# Patient Record
Sex: MALE | Race: WHITE | NOT HISPANIC OR LATINO | Employment: FULL TIME | URBAN - METROPOLITAN AREA
[De-identification: names, ages, dates, MRNs, and addresses within clinical notes are randomized per-mention and may not be internally consistent; named-entity substitution may affect disease eponyms.]

---

## 2024-04-04 ASSESSMENT — PATIENT HEALTH QUESTIONNAIRE - PHQ9
10. IF YOU CHECKED OFF ANY PROBLEMS, HOW DIFFICULT HAVE THESE PROBLEMS MADE IT FOR YOU TO DO YOUR WORK, TAKE CARE OF THINGS AT HOME, OR GET ALONG WITH OTHER PEOPLE: SOMEWHAT DIFFICULT
SUM OF ALL RESPONSES TO PHQ QUESTIONS 1-9: 18
SUM OF ALL RESPONSES TO PHQ QUESTIONS 1-9: 18

## 2024-04-05 ENCOUNTER — OFFICE VISIT (OUTPATIENT)
Dept: FAMILY MEDICINE | Facility: CLINIC | Age: 29
End: 2024-04-05
Payer: COMMERCIAL

## 2024-04-05 VITALS
OXYGEN SATURATION: 100 % | BODY MASS INDEX: 25.2 KG/M2 | RESPIRATION RATE: 18 BRPM | HEART RATE: 88 BPM | TEMPERATURE: 98.7 F | HEIGHT: 71 IN | DIASTOLIC BLOOD PRESSURE: 83 MMHG | SYSTOLIC BLOOD PRESSURE: 119 MMHG | WEIGHT: 180 LBS

## 2024-04-05 DIAGNOSIS — Z11.4 SCREENING FOR HIV (HUMAN IMMUNODEFICIENCY VIRUS): ICD-10-CM

## 2024-04-05 DIAGNOSIS — Z13.220 LIPID SCREENING: ICD-10-CM

## 2024-04-05 DIAGNOSIS — Z11.3 SCREEN FOR STD (SEXUALLY TRANSMITTED DISEASE): ICD-10-CM

## 2024-04-05 DIAGNOSIS — R21 RASH AND NONSPECIFIC SKIN ERUPTION: ICD-10-CM

## 2024-04-05 DIAGNOSIS — Z00.00 ROUTINE HISTORY AND PHYSICAL EXAMINATION OF ADULT: Primary | ICD-10-CM

## 2024-04-05 DIAGNOSIS — R06.83 SNORING: ICD-10-CM

## 2024-04-05 DIAGNOSIS — G47.00 INSOMNIA, UNSPECIFIED TYPE: ICD-10-CM

## 2024-04-05 DIAGNOSIS — F33.1 MODERATE EPISODE OF RECURRENT MAJOR DEPRESSIVE DISORDER (H): ICD-10-CM

## 2024-04-05 DIAGNOSIS — Z11.59 NEED FOR HEPATITIS C SCREENING TEST: ICD-10-CM

## 2024-04-05 DIAGNOSIS — F41.1 GAD (GENERALIZED ANXIETY DISORDER): ICD-10-CM

## 2024-04-05 DIAGNOSIS — J34.2 NASAL SEPTAL DEVIATION: ICD-10-CM

## 2024-04-05 DIAGNOSIS — R06.5 MOUTH BREATHING: ICD-10-CM

## 2024-04-05 LAB
ERYTHROCYTE [DISTWIDTH] IN BLOOD BY AUTOMATED COUNT: 11.8 % (ref 10–15)
HCT VFR BLD AUTO: 47.4 % (ref 40–53)
HGB BLD-MCNC: 16.1 G/DL (ref 13.3–17.7)
MCH RBC QN AUTO: 28.2 PG (ref 26.5–33)
MCHC RBC AUTO-ENTMCNC: 34 G/DL (ref 31.5–36.5)
MCV RBC AUTO: 83 FL (ref 78–100)
PLATELET # BLD AUTO: 230 10E3/UL (ref 150–450)
RBC # BLD AUTO: 5.7 10E6/UL (ref 4.4–5.9)
WBC # BLD AUTO: 3.8 10E3/UL (ref 4–11)

## 2024-04-05 PROCEDURE — 87389 HIV-1 AG W/HIV-1&-2 AB AG IA: CPT | Performed by: INTERNAL MEDICINE

## 2024-04-05 PROCEDURE — 90686 IIV4 VACC NO PRSV 0.5 ML IM: CPT | Performed by: INTERNAL MEDICINE

## 2024-04-05 PROCEDURE — 87491 CHLMYD TRACH DNA AMP PROBE: CPT | Performed by: INTERNAL MEDICINE

## 2024-04-05 PROCEDURE — 99213 OFFICE O/P EST LOW 20 MIN: CPT | Mod: 25 | Performed by: INTERNAL MEDICINE

## 2024-04-05 PROCEDURE — 90471 IMMUNIZATION ADMIN: CPT | Performed by: INTERNAL MEDICINE

## 2024-04-05 PROCEDURE — 85027 COMPLETE CBC AUTOMATED: CPT | Performed by: INTERNAL MEDICINE

## 2024-04-05 PROCEDURE — 90480 ADMN SARSCOV2 VAC 1/ONLY CMP: CPT | Performed by: INTERNAL MEDICINE

## 2024-04-05 PROCEDURE — 91320 SARSCV2 VAC 30MCG TRS-SUC IM: CPT | Performed by: INTERNAL MEDICINE

## 2024-04-05 PROCEDURE — 86695 HERPES SIMPLEX TYPE 1 TEST: CPT | Performed by: INTERNAL MEDICINE

## 2024-04-05 PROCEDURE — 90472 IMMUNIZATION ADMIN EACH ADD: CPT | Performed by: INTERNAL MEDICINE

## 2024-04-05 PROCEDURE — 99000 SPECIMEN HANDLING OFFICE-LAB: CPT | Performed by: INTERNAL MEDICINE

## 2024-04-05 PROCEDURE — 99385 PREV VISIT NEW AGE 18-39: CPT | Mod: 25 | Performed by: INTERNAL MEDICINE

## 2024-04-05 PROCEDURE — 86803 HEPATITIS C AB TEST: CPT | Performed by: INTERNAL MEDICINE

## 2024-04-05 PROCEDURE — 80053 COMPREHEN METABOLIC PANEL: CPT | Performed by: INTERNAL MEDICINE

## 2024-04-05 PROCEDURE — 86696 HERPES SIMPLEX TYPE 2 TEST: CPT | Performed by: INTERNAL MEDICINE

## 2024-04-05 PROCEDURE — 86140 C-REACTIVE PROTEIN: CPT | Performed by: INTERNAL MEDICINE

## 2024-04-05 PROCEDURE — 36415 COLL VENOUS BLD VENIPUNCTURE: CPT | Performed by: INTERNAL MEDICINE

## 2024-04-05 PROCEDURE — 90715 TDAP VACCINE 7 YRS/> IM: CPT | Performed by: INTERNAL MEDICINE

## 2024-04-05 PROCEDURE — 86592 SYPHILIS TEST NON-TREP QUAL: CPT | Performed by: INTERNAL MEDICINE

## 2024-04-05 PROCEDURE — 86780 TREPONEMA PALLIDUM: CPT | Performed by: INTERNAL MEDICINE

## 2024-04-05 PROCEDURE — 87591 N.GONORRHOEAE DNA AMP PROB: CPT | Performed by: INTERNAL MEDICINE

## 2024-04-05 PROCEDURE — 84443 ASSAY THYROID STIM HORMONE: CPT | Performed by: INTERNAL MEDICINE

## 2024-04-05 ASSESSMENT — PAIN SCALES - GENERAL: PAINLEVEL: NO PAIN (0)

## 2024-04-05 NOTE — RESULT ENCOUNTER NOTE
Kahlil King-reviewed some of your labs,  CBC shows slightly low white blood cell count of 3800; normal greater than 4000, unsure etiology at this point,  I do not have prior blood counts to compare.  Hemoglobin hematocrit is normal reassuring; sometimes there is no anemia, and platelet count is normal.  I recommend we repeat your white blood cell count in the next 4 weeks by lab.  Please call the lab to schedule.  I will place an order for such.  Low white count sometimes occur during a viral illness.  You did not have any signs or symptoms of viral illness currently,  Rest of labs are pending ,  Regards,  Dr Boswell

## 2024-04-05 NOTE — PROGRESS NOTES
"  Assessment & Plan   Problem List Items Addressed This Visit    None  Visit Diagnoses       Routine history and physical examination of adult    -  Primary    Screening for HIV (human immunodeficiency virus)        Relevant Orders    HIV Antigen Antibody Combo (Completed)    Need for hepatitis C screening test        Relevant Orders    Hepatitis C Screen Reflex to HCV RNA Quant and Genotype (Completed)    Lipid screening        Moderate episode of recurrent major depressive disorder (H)        Occasional suicidal ideation \"no need to be here\", with no plans in place for self-harm or others.    VANNESSA (generalized anxiety disorder)        Relevant Orders    CBC with platelets (Completed)    Comprehensive metabolic panel (BMP + Alb, Alk Phos, ALT, AST, Total. Bili, TP) (Completed)    TSH with free T4 reflex (Completed)    Adult Mental Health  Referral    Adult Mental Health  Referral    Screen for STD (sexually transmitted disease)        Relevant Orders    NEISSERIA GONORRHOEA PCR (Completed)    CHLAMYDIA TRACHOMATIS PCR (Completed)    Treponema Abs w Reflex to RPR and Titer (Completed)    Herpes Simplex Virus 1 and 2 IgG    Adult Mental Health  Referral    Adult Mental Health  Referral    Rapid Plasma Reagin w Rflx to TITER    Insomnia, unspecified type        Relevant Orders    Adult Mental Health  Referral    Adult Mental Health  Referral    Nasal septal deviation        Relevant Orders    Adult ENT  Referral    Snoring        Relevant Orders    Adult ENT  Referral    Mouth breathing        Relevant Orders    Adult ENT  Referral    Rash and nonspecific skin eruption        Suspected eczema, applied anti-itch cream plus moisturizer.  If not helpful will add triamcinolone.  Refer to dermatology    Relevant Orders    Adult Dermatology  Referral           Discuss chronic health conditions, history of anxiety and depression agrees " "to see mental health specialist in addition to counseling and psychiatry.  Has some nasal septal deviation suspected and nasal obstruction, Also has associated snoring at times.  No known apneas.referral to ENT.  Has insomnia also related to probable underlying anxiety.  Continue with exercise activity and healthy diet etc.  Patient requesting STD screening.  Has history of syphilis in the past treated :  Denies any urethral discharge.  He is in a monogamous relation with a male partner, he is not on any antiviral prophylactic therapy; partner is.  Has upper GI stomach issues feeling nauseous at times and bloated, consider H. pylori testing in future.      Addendum labs show slightly low white blood cell count advise repeating CBC next 4 weeks.    BMI  Estimated body mass index is 25.1 kg/m  as calculated from the following:    Height as of this encounter: 1.803 m (5' 11\").    Weight as of this encounter: 81.6 kg (180 lb).   Weight management plan: Discussed healthy diet and exercise guidelines    Depression Screening Follow Up        4/4/2024    11:49 AM   PHQ   PHQ-9 Total Score 18   Q9: Thoughts of better off dead/self-harm past 2 weeks Several days   F/U: Thoughts of suicide or self-harm No   F/U: Safety concerns No         4/4/2024    11:49 AM   Last PHQ-9   1.  Little interest or pleasure in doing things 2   2.  Feeling down, depressed, or hopeless 2   3.  Trouble falling or staying asleep, or sleeping too much 3   4.  Feeling tired or having little energy 3   5.  Poor appetite or overeating 2   6.  Feeling bad about yourself 2   7.  Trouble concentrating 2   8.  Moving slowly or restless 1   Q9: Thoughts of better off dead/self-harm past 2 weeks 1   PHQ-9 Total Score 18   In the past two weeks have you had thoughts of suicide or self harm? No   Do you have concerns about your personal safety or the safety of others? No                No data to display                      Follow Up Actions Taken  Crisis " resource information provided in the After Visit Summary  Mental Health Referral placed    Discussed the following ways the patient can remain in a safe environment:  be around others  Work on weight loss  Regular exercise  See Patient Instructions      William King is a 28 year old, presenting for the following health issues:  Establish Care and LAB REQUEST (STI/std UA Order)  Patient presenting to establish care.  Requesting some labs and physical.  He has some plaque raised like lesions on his elbows slightly scaly itchy at times.  He has also history of depression anxiety, and strong family history, has occasional suicidal ideation but no plans in place, he was on medication in the past made him super tired, he does 2 jobs now.  Has been off meds for 3 years now, medications made him worse super tired, his mother has bipolar disorder she is on meds for anxiety and depression history of?  ADHD, also has some problems with his breathing, he is a mouth breather, had MRI that showed deviated septum, has occasional snoring.  Previous surgical history of the graft on his gum.  Also sister with lupus and brother history of leukemia..  Has some stomach issues feels nauseated at times and bloated.      4/5/2024    11:13 AM   Additional Questions   Roomed by Charlene   Accompanied by Not applicable, by themselves     History of Present Illness       Reason for visit:  General health check    He eats 0-1 servings of fruits and vegetables daily.He consumes 1 sweetened beverage(s) daily.He exercises with enough effort to increase his heart rate 9 or less minutes per day.  He exercises with enough effort to increase his heart rate 3 or less days per week.   He is taking medications regularly.             Review of Systems  Constitutional, neuro, ENT, endocrine, pulmonary, cardiac, gastrointestinal, genitourinary, musculoskeletal, integument and psychiatric systems are negative, except as otherwise noted.      Objective   "  /83   Pulse 88   Temp 98.7  F (37.1  C) (Temporal)   Resp 18   Ht 1.803 m (5' 11\")   Wt 81.6 kg (180 lb)   SpO2 100%   BMI 25.10 kg/m    Body mass index is 25.1 kg/m .  Physical Exam   GENERAL: alert and no distress  EYES: Eyes grossly normal to inspection, PERRL and conjunctivae and sclerae normal  HENT: ear canals and TM's normal, nose and mouth without ulcers or lesions  NECK: no adenopathy, no asymmetry, masses, or scars  RESP: lungs clear to auscultation - no rales, rhonchi or wheezes  CV: regular rate and rhythm, normal S1 S2, no S3 or S4, no murmur, click or rub, no peripheral edema  ABDOMEN: soft, nontender, no hepatosplenomegaly, no masses and bowel sounds normal  MS: no gross musculoskeletal defects noted, no edema  SKIN: no suspicious lesions or rashes  NEURO: Normal strength and tone, mentation intact and speech normal  PSYCH: mentation appears normal, affect normal/bright    No results found for any previous visit.           Signed Electronically by: Tamela Boswell MD    "

## 2024-04-06 DIAGNOSIS — D72.819 LEUKOPENIA, UNSPECIFIED TYPE: Primary | ICD-10-CM

## 2024-04-06 LAB
ALBUMIN SERPL BCG-MCNC: 5.1 G/DL (ref 3.5–5.2)
ALP SERPL-CCNC: 65 U/L (ref 40–150)
ALT SERPL W P-5'-P-CCNC: 23 U/L (ref 0–70)
ANION GAP SERPL CALCULATED.3IONS-SCNC: 13 MMOL/L (ref 7–15)
AST SERPL W P-5'-P-CCNC: 22 U/L (ref 0–45)
BILIRUB SERPL-MCNC: 0.4 MG/DL
BUN SERPL-MCNC: 14 MG/DL (ref 6–20)
C TRACH DNA SPEC QL NAA+PROBE: NEGATIVE
CALCIUM SERPL-MCNC: 9.9 MG/DL (ref 8.6–10)
CHLORIDE SERPL-SCNC: 103 MMOL/L (ref 98–107)
CREAT SERPL-MCNC: 0.78 MG/DL (ref 0.67–1.17)
CRP SERPL-MCNC: <3 MG/L
DEPRECATED HCO3 PLAS-SCNC: 26 MMOL/L (ref 22–29)
EGFRCR SERPLBLD CKD-EPI 2021: >90 ML/MIN/1.73M2
GLUCOSE SERPL-MCNC: 85 MG/DL (ref 70–99)
HCV AB SERPL QL IA: NONREACTIVE
HIV 1+2 AB+HIV1 P24 AG SERPL QL IA: NONREACTIVE
N GONORRHOEA DNA SPEC QL NAA+PROBE: NEGATIVE
POTASSIUM SERPL-SCNC: 4.4 MMOL/L (ref 3.4–5.3)
PROT SERPL-MCNC: 7.6 G/DL (ref 6.4–8.3)
SODIUM SERPL-SCNC: 142 MMOL/L (ref 135–145)
T PALLIDUM AB SER QL: REACTIVE
TSH SERPL DL<=0.005 MIU/L-ACNC: 1.2 UIU/ML (ref 0.3–4.2)

## 2024-04-06 NOTE — RESULT ENCOUNTER NOTE
Kahlil King, I reviewed some of your labs,  STD testing including hepatitis C and HIV are negative/nonreactive,  Treponema Antibody Total is reactive waiting for RPR test.  Chemistry shows normal electrolytes, normal kidney function test, normal liver enzymes,  Thyroid test called TSH is normal, other STD testing including Neisseria gonorrhea and chlamydia are negative/nonreactive,  CBC shows low white blood cell count, please repeat CBC in the next 4 weeks.  Any further questions please let me know  Dr Boswell

## 2024-04-07 NOTE — RESULT ENCOUNTER NOTE
Kahlil King' I checked inflammatory marker called CRP and is negative, suggests no systemic inflammation, added such to your labs due to the slightly low white blood cell count.  Again as advised please repeat your CBC in the next 4 weeks to make sure the white cell count has improved to normal.  The white blood cell count was slightly low but is not critical and should not affect your immunity to fight infection in any event..  Regards,  Dr Boswell

## 2024-04-08 LAB
HSV1 IGG SERPL QL IA: 1.26 INDEX
HSV1 IGG SERPL QL IA: ABNORMAL
HSV2 IGG SERPL QL IA: 0.61 INDEX
HSV2 IGG SERPL QL IA: ABNORMAL
RPR SER QL: NONREACTIVE

## 2024-04-08 NOTE — RESULT ENCOUNTER NOTE
Please notify patient some of labs are back for STD screen:    HSV-1 titer type I antibodies positive, type II is negative.  Herpes simplex type II usually close genital herpes.  Herpes simplex virus type I infection is very common in normal healthy people.

## 2024-04-09 ENCOUNTER — TELEPHONE (OUTPATIENT)
Dept: FAMILY MEDICINE | Facility: CLINIC | Age: 29
End: 2024-04-09
Payer: COMMERCIAL

## 2024-04-09 LAB — T PALLIDUM AB SER QL AGGL: REACTIVE

## 2024-04-09 NOTE — RESULT ENCOUNTER NOTE
Please reassure rapid plasma reagin which is confirmatory syphilis test is nonreactive; negative; part of STD testing screening.  Dr Boswell

## 2024-04-15 ENCOUNTER — OFFICE VISIT (OUTPATIENT)
Dept: DERMATOLOGY | Facility: CLINIC | Age: 29
End: 2024-04-15
Payer: COMMERCIAL

## 2024-04-15 DIAGNOSIS — L30.8 PSORIASIFORM DERMATITIS: Primary | ICD-10-CM

## 2024-04-15 DIAGNOSIS — R21 RASH AND NONSPECIFIC SKIN ERUPTION: ICD-10-CM

## 2024-04-15 DIAGNOSIS — L64.9 ANDROGENETIC ALOPECIA: ICD-10-CM

## 2024-04-15 PROCEDURE — 99204 OFFICE O/P NEW MOD 45 MIN: CPT | Performed by: PHYSICIAN ASSISTANT

## 2024-04-15 RX ORDER — TRIAMCINOLONE ACETONIDE 1 MG/G
OINTMENT TOPICAL
Qty: 30 G | Refills: 4 | Status: SHIPPED | OUTPATIENT
Start: 2024-04-15

## 2024-04-15 RX ORDER — FINASTERIDE 1 MG/1
1 TABLET, FILM COATED ORAL DAILY
Qty: 90 TABLET | Refills: 3 | Status: SHIPPED | OUTPATIENT
Start: 2024-04-15

## 2024-04-15 RX ORDER — DEXTROAMPHETAMINE SACCHARATE, AMPHETAMINE ASPARTATE MONOHYDRATE, DEXTROAMPHETAMINE SULFATE AND AMPHETAMINE SULFATE 2.5; 2.5; 2.5; 2.5 MG/1; MG/1; MG/1; MG/1
10 CAPSULE, EXTENDED RELEASE ORAL
COMMUNITY
Start: 2023-03-29

## 2024-04-15 RX ORDER — FINASTERIDE 1 MG/1
1 TABLET, FILM COATED ORAL DAILY
COMMUNITY
End: 2024-04-15

## 2024-04-15 RX ORDER — BUPROPION HYDROCHLORIDE 150 MG/1
150 TABLET ORAL
COMMUNITY
Start: 2023-03-08

## 2024-04-15 ASSESSMENT — PAIN SCALES - GENERAL: PAINLEVEL: NO PAIN (0)

## 2024-04-15 NOTE — NURSING NOTE
Dermatology Rooming Note    Fernando Del Castillo's goals for this visit include:   Chief Complaint   Patient presents with    Derm Problem     Fernando is here today for a rash on the elbows that cracks      Alma Delia DAVILA, CMA

## 2024-04-15 NOTE — LETTER
4/15/2024       RE: Fernando Del Castillo  [address] [place] [address]     Dear Colleague,    Thank you for referring your patient, Fernando Del Castillo, to the [facility] [place] at [facility]. Please see a copy of my visit note below.    [facility] Dermatology Note  Encounter Date: Apr 15, 2024  Office Visit     Reviewed patients past medical history and pertinent chart review prior to patients visit today.     Dermatology Problem List:  # Psoriasiform dermatitis, elbows  - triamcinolone 0.1% ointment  # Androgenetic alopecia  - finasteride 1 mg daily    Social history: Works at [employer]  ____________________________________________    Assessment & Plan:     # Psoriasiform dermatitis, elbows  - The patient is not currently flaring today, but clinical history and findings are most consistent with psoriasiform dermatitis.   - Start triamcinolone 0.1% ointment twice daily for 2 weeks. Restart if rash flares again in the future. We reviewed potential side effects, including skin atrophy.    - We discussed the importance of daily emollients. Options include Vanicream, CeraVe Cream, Cetaphil Cream, or Vaseline. Avoid hot water when bathing. Use non-scented soaps and detergents. Pat skin dry instead of vigorous rubbing.     # Androgenetic alopecia   - Finasteride 1 mg daily.   - The patient reports tolerating the medication well. We discussed potential side effects including mood changes, sexual dysfunction, and gynecomastia.  - We discussed that finasteride may decrease PSA levels, causing for interpretation to be more difficult.     Follow up as needed.     All risks, benefits and alternatives were discussed with patient.  Patient is in agreement and understands the assessment and plan.  All questions were answered.  Korina Yancey PA-C  [facility] Dermatology  _______________________________________    CC: Derm  Problem    HPI:  Mr. Fernando Del Castillo is a(n) 28 year old male who presents today as a new patient for a rash on the elbows. This has been intermittently flaring over the years and is quite itchy. He has been using OTC eczema creams with some improvement. The patient reports a recent history of seasonal allergies, no history of asthma. He denies a known family history of eczema or psoriasis.  Additionally, the patient request refills of his finasteride 1 mg daily.  The patient reports tolerating this medication well with no side effects of mood changes, sexual dysfunction, or gynecomastia. He reports his hairline has remained stable. Patient is otherwise feeling well, without additional skin concerns.      Physical Exam:  SKIN: Focused examination of scalp and elbow was performed.  - Lichenification and scale involving the bilateral elbows (R > L).     - No other lesions of concern on areas examined.     Medications:  Current Outpatient Medications   Medication Sig Dispense Refill    amphetamine-dextroamphetamine (ADDERALL XR) 10 MG 24 hr capsule Take 10 mg by mouth      buPROPion (WELLBUTRIN XL) 150 MG 24 hr tablet Take 150 mg by mouth      finasteride (PROPECIA) 1 MG tablet Take 1 tablet by mouth daily       No current facility-administered medications for this visit.      Past Medical History:   There is no problem list on file for this patient.    No past medical history on file.    CC Tamela Boswell MD  [address] [place] [id number] on close of this encounter.

## 2024-04-15 NOTE — PROGRESS NOTES
[facility] Dermatology Note  Encounter Date: Apr 15, 2024  Office Visit     Reviewed patients past medical history and pertinent chart review prior to patients visit today.     Dermatology Problem List:  # Psoriasiform dermatitis, elbows  - triamcinolone 0.1% ointment  # Androgenetic alopecia  - finasteride 1 mg daily    Social history: Works at [employer]  ____________________________________________    Assessment & Plan:     # Psoriasiform dermatitis, elbows  - The patient is not currently flaring today, but clinical history and findings are most consistent with psoriasiform dermatitis.   - Start triamcinolone 0.1% ointment twice daily for 2 weeks. Restart if rash flares again in the future. We reviewed potential side effects, including skin atrophy.    - We discussed the importance of daily emollients. Options include Vanicream, CeraVe Cream, Cetaphil Cream, or Vaseline. Avoid hot water when bathing. Use non-scented soaps and detergents. Pat skin dry instead of vigorous rubbing.     # Androgenetic alopecia   - Finasteride 1 mg daily.   - The patient reports tolerating the medication well. We discussed potential side effects including mood changes, sexual dysfunction, and gynecomastia.  - We discussed that finasteride may decrease PSA levels, causing for interpretation to be more difficult.     Follow up as needed.     All risks, benefits and alternatives were discussed with patient.  Patient is in agreement and understands the assessment and plan.  All questions were answered.  Korina Yancey PA-C  [facility] Dermatology  _______________________________________    CC: Derm Problem    HPI:  Mr. Fernando Del Castillo is a(n) 28 year old male who presents today as a new patient for a rash on the elbows. This has been intermittently flaring over the years and is quite itchy. He has been using OTC eczema creams with some improvement. The patient reports a recent history of seasonal allergies, no  history of asthma. He denies a known family history of eczema or psoriasis.  Additionally, the patient request refills of his finasteride 1 mg daily.  The patient reports tolerating this medication well with no side effects of mood changes, sexual dysfunction, or gynecomastia. He reports his hairline has remained stable. Patient is otherwise feeling well, without additional skin concerns.      Physical Exam:  SKIN: Focused examination of scalp and elbow was performed.  - Lichenification and scale involving the bilateral elbows (R > L).     - No other lesions of concern on areas examined.     Medications:  Current Outpatient Medications   Medication Sig Dispense Refill    amphetamine-dextroamphetamine (ADDERALL XR) 10 MG 24 hr capsule Take 10 mg by mouth      buPROPion (WELLBUTRIN XL) 150 MG 24 hr tablet Take 150 mg by mouth      finasteride (PROPECIA) 1 MG tablet Take 1 tablet by mouth daily       No current facility-administered medications for this visit.      Past Medical History:   There is no problem list on file for this patient.    No past medical history on file.    CC Tamela Boswell MD  [address] [id number] on close of this encounter.

## 2024-04-25 NOTE — TELEPHONE ENCOUNTER
"FUTURE VISIT INFORMATION      FUTURE VISIT INFORMATION:  Date: 7/31/24  Time: 9AM  Location: [facility] [place]  REFERRAL INFORMATION:  Referring provider:  Tamela Boswell MD  Referring providers clinic:  [facility]   Reason for visit/diagnosis  NASAL DEVIATION, SNORING, MOUTH BREATHING  OUTSIDE OF NOSE IS CROOKED AND WOULD LIKE IT FIXED  REF BY Tamela Boswell MD  RECS IN [facility]  CONF LOC  SCHED W/PT     RECORDS REQUESTED FROM:       Clinic name Comments Records Status Imaging Status   [facility] 4/5/24- OV Tamela Boswell MD Epic             \"Please notify/message CSS if patient completed outside imaging prior to scheduled appointment and/or any outside records that might have been missed at pre visit -Thank you\"  "

## 2024-05-08 ENCOUNTER — LAB (OUTPATIENT)
Dept: LAB | Facility: CLINIC | Age: 29
End: 2024-05-08
Payer: COMMERCIAL

## 2024-05-08 DIAGNOSIS — D72.819 LEUKOPENIA, UNSPECIFIED TYPE: ICD-10-CM

## 2024-05-08 LAB
BASOPHILS # BLD AUTO: 0 10E3/UL (ref 0–0.2)
BASOPHILS NFR BLD AUTO: 0 %
EOSINOPHIL # BLD AUTO: 0.1 10E3/UL (ref 0–0.7)
EOSINOPHIL NFR BLD AUTO: 1 %
ERYTHROCYTE [DISTWIDTH] IN BLOOD BY AUTOMATED COUNT: 11.9 % (ref 10–15)
HCT VFR BLD AUTO: 46.3 % (ref 40–53)
HGB BLD-MCNC: 16 G/DL (ref 13.3–17.7)
IMM GRANULOCYTES # BLD: 0 10E3/UL
IMM GRANULOCYTES NFR BLD: 0 %
LYMPHOCYTES # BLD AUTO: 1.6 10E3/UL (ref 0.8–5.3)
LYMPHOCYTES NFR BLD AUTO: 44 %
MCH RBC QN AUTO: 28.2 PG (ref 26.5–33)
MCHC RBC AUTO-ENTMCNC: 34.6 G/DL (ref 31.5–36.5)
MCV RBC AUTO: 82 FL (ref 78–100)
MONOCYTES # BLD AUTO: 0.3 10E3/UL (ref 0–1.3)
MONOCYTES NFR BLD AUTO: 8 %
NEUTROPHILS # BLD AUTO: 1.8 10E3/UL (ref 1.6–8.3)
NEUTROPHILS NFR BLD AUTO: 47 %
PLATELET # BLD AUTO: 231 10E3/UL (ref 150–450)
RBC # BLD AUTO: 5.68 10E6/UL (ref 4.4–5.9)
WBC # BLD AUTO: 3.7 10E3/UL (ref 4–11)

## 2024-05-08 PROCEDURE — 85025 COMPLETE CBC W/AUTO DIFF WBC: CPT

## 2024-05-08 PROCEDURE — 36415 COLL VENOUS BLD VENIPUNCTURE: CPT

## 2024-05-20 ENCOUNTER — MYC MEDICAL ADVICE (OUTPATIENT)
Dept: FAMILY MEDICINE | Facility: CLINIC | Age: 29
End: 2024-05-20
Payer: COMMERCIAL

## 2024-05-20 DIAGNOSIS — D72.819 LEUKOPENIA, UNSPECIFIED TYPE: Primary | ICD-10-CM

## 2024-05-20 DIAGNOSIS — G47.00 INSOMNIA, UNSPECIFIED TYPE: ICD-10-CM

## 2024-05-20 PROCEDURE — 99207 E-CONSULT TO HEMATOLOGY (ADULT OUTPT PROVIDER TO SPECIALIST WRITTEN QUESTION & RESPONSE): CPT | Performed by: INTERNAL MEDICINE

## 2024-05-22 RX ORDER — TRAZODONE HYDROCHLORIDE 50 MG/1
50 TABLET, FILM COATED ORAL AT BEDTIME
Qty: 90 TABLET | Refills: 1 | Status: CANCELLED | OUTPATIENT
Start: 2024-05-22

## 2024-05-22 NOTE — TELEPHONE ENCOUNTER
Dr. Boswell,    Please see pt's MyChart messages and advise.   Orders pended for your review.     Thank you,  Sindhu Webb RN

## 2024-05-23 ENCOUNTER — E-CONSULT (OUTPATIENT)
Dept: ONCOLOGY | Facility: CLINIC | Age: 29
End: 2024-05-23
Payer: COMMERCIAL

## 2024-05-23 PROCEDURE — 99451 NTRPROF PH1/NTRNET/EHR 5/>: CPT | Performed by: INTERNAL MEDICINE

## 2024-05-23 NOTE — PROGRESS NOTES
5/23/2024     E-Consult has been accepted.    Interprofessional consultation requested by:  Tamela Boswell MD      Clinical Question/Purpose: MY CLINICAL QUESTION IS: Low white blood cell count    Patient assessment and information reviewed:   Otherwise healthy young man with WBC ~3.7 x 2 checks in April and May. Normal ANC, ALC, and other WBC subsets. Similar findings in 3/2023 and 4/2021. MCV in 3/2023 was a little low at 82 but ferritin and fe sat were normal.     Recommendations:   Reassurance, especially as each of the WBC subsets is normal.      The recommendations provided in this E-Consult are based on a review of clinical data pertinent to the clinical question presented, without a review of the patient's complete medical record or, the benefit of a comprehensive in-person or virtual patient evaluation. This consultation should not replace the clinical judgement and evaluation of the provider ordering this E-Consult. Any new clinical issues, or changes in patient status since the filing of this E-Consult will need to be taken into account when assessing these recommendations. Please contact me if you have further questions.    My total time spent reviewing clinical information and formulating assessment was 5 minutes.    Eusebio Streeter MD

## 2024-05-29 ENCOUNTER — VIRTUAL VISIT (OUTPATIENT)
Dept: FAMILY MEDICINE | Facility: CLINIC | Age: 29
End: 2024-05-29
Payer: COMMERCIAL

## 2024-05-29 DIAGNOSIS — G47.09 OTHER INSOMNIA: Primary | ICD-10-CM

## 2024-05-29 DIAGNOSIS — F41.9 ANXIETY AND DEPRESSION: ICD-10-CM

## 2024-05-29 DIAGNOSIS — F43.23 ADJUSTMENT REACTION WITH ANXIETY AND DEPRESSION: ICD-10-CM

## 2024-05-29 DIAGNOSIS — F90.9 ATTENTION DEFICIT HYPERACTIVITY DISORDER (ADHD), UNSPECIFIED ADHD TYPE: ICD-10-CM

## 2024-05-29 DIAGNOSIS — F32.A ANXIETY AND DEPRESSION: ICD-10-CM

## 2024-05-29 DIAGNOSIS — D72.819 LEUKOPENIA, UNSPECIFIED TYPE: ICD-10-CM

## 2024-05-29 PROCEDURE — 99214 OFFICE O/P EST MOD 30 MIN: CPT | Mod: 95 | Performed by: INTERNAL MEDICINE

## 2024-05-29 RX ORDER — TRAZODONE HYDROCHLORIDE 50 MG/1
50 TABLET, FILM COATED ORAL AT BEDTIME
Qty: 90 TABLET | Refills: 0 | Status: SHIPPED | OUTPATIENT
Start: 2024-05-29 | End: 2024-07-30

## 2024-05-29 RX ORDER — TRAZODONE HYDROCHLORIDE 50 MG/1
TABLET, FILM COATED ORAL
COMMUNITY
Start: 2024-04-19 | End: 2024-05-29

## 2024-05-29 ASSESSMENT — PATIENT HEALTH QUESTIONNAIRE - PHQ9: SUM OF ALL RESPONSES TO PHQ QUESTIONS 1-9: 9

## 2024-05-29 NOTE — PROGRESS NOTES
Fernando is a 28 year old who is being evaluated via a billable video visit.    How would you like to obtain your AVS? MyChart  If the video visit is dropped, the invitation should be resent by: Text to cell phone: [phone]  Will anyone else be joining your video visit? No      Assessment & Plan   Problem List Items Addressed This Visit    None  Visit Diagnoses       Other insomnia    -  Primary    Relevant Medications    traZODone (DESYREL) 50 MG tablet    Other Relevant Orders    Adult Mental Health  Referral    Adult Mental Health  Referral    Adult Mental Health  Referral    Adjustment reaction with anxiety and depression        Anxiety and depression        Relevant Medications    traZODone (DESYREL) 50 MG tablet    Other Relevant Orders    Adult Mental Health  Referral    Adult Mental Health  Referral    Adult Mental Health  Referral    Attention deficit hyperactivity disorder (ADHD), unspecified ADHD type        Relevant Orders    Adult Mental Health  Referral    Adult Mental Health  Referral    Adult Mental Health  Referral    Leukopenia, unspecified type            Refill given on trazodone advised on side effects we will check a baseline EKG.  Discussed about QT prolongation and serotonin syndrome etc.  Establish with counseling and psychiatry at [place].  Meanwhile we will continue to refill medications as needed.  Exercise and lifestyle changes.  All questions answered    HEMATOLOGY NOTE.  Reviewed with patient hematology note via follow-up telephone visit.  Per hematology intermittent slightly low white count is not concerning at this point we will continue to monitor.  Will repeat CBC again in 6 months.                 Subjective   Fernando is a 28 year old, presenting for the following health issues:  Refill Request, Recheck Medication, and Results        5/29/2024     4:12 PM   Additional Questions   Roomed by Charlene    Accompanied by Not applicable, by themselves     HPI     Patient presenting for follow-up.  He had seen counseling and psychiatry outside [place], does not want to follow with counseling the provider he is seen.  Psychiatry advised him to do an ADHD panel and prescribe for him trazodone 50 mg that seems to help him with his sleep, he used to struggle with falling asleep and staying asleep he is a light sleeper he reports.  Trazodone has helped him to sleep better without interruption.  It is helping quite a bit with his depression symptoms.  He takes before bedtime and he sleeps throughout the night, he felt pretty good as far as mood he feels happy and upbeat for the duration was on trazodone he ran out a week ago.  He is not on Adderall never refilled the prescription.  He is seeing ENT as well.  Denies any suicidal ideations.    Review of Systems  Constitutional, HEENT, cardiovascular, pulmonary, gi and gu systems are negative, except as otherwise noted.      Objective           Vitals:  No vitals were obtained today due to virtual visit.    Physical Exam   GENERAL: alert and no distress  EYES: Eyes grossly normal to inspection.  No discharge or erythema, or obvious scleral/conjunctival abnormalities.  RESP: No audible wheeze, cough, or visible cyanosis.    SKIN: Visible skin clear. No significant rash, abnormal pigmentation or lesions.  NEURO: Cranial nerves grossly intact.  Mentation and speech appropriate for age.  PSYCH: Appropriate affect, tone, and pace of words    Lab on 05/08/2024   Component Date Value Ref Range Status    WBC Count 05/08/2024 3.7 (L)  4.0 - 11.0 10e3/uL Final    RBC Count 05/08/2024 5.68  4.40 - 5.90 10e6/uL Final    Hemoglobin 05/08/2024 16.0  13.3 - 17.7 g/dL Final    Hematocrit 05/08/2024 46.3  40.0 - 53.0 % Final    MCV 05/08/2024 82  78 - 100 fL Final    MCH 05/08/2024 28.2  26.5 - 33.0 pg Final    MCHC 05/08/2024 34.6  31.5 - 36.5 g/dL Final    RDW 05/08/2024 11.9  10.0 - 15.0 %  Final    Platelet Count 05/08/2024 231  150 - 450 10e3/uL Final    % Neutrophils 05/08/2024 47  % Final    % Lymphocytes 05/08/2024 44  % Final    % Monocytes 05/08/2024 8  % Final    % Eosinophils 05/08/2024 1  % Final    % Basophils 05/08/2024 0  % Final    % Immature Granulocytes 05/08/2024 0  % Final    Absolute Neutrophils 05/08/2024 1.8  1.6 - 8.3 10e3/uL Final    Absolute Lymphocytes 05/08/2024 1.6  0.8 - 5.3 10e3/uL Final    Absolute Monocytes 05/08/2024 0.3  0.0 - 1.3 10e3/uL Final    Absolute Eosinophils 05/08/2024 0.1  0.0 - 0.7 10e3/uL Final    Absolute Basophils 05/08/2024 0.0  0.0 - 0.2 10e3/uL Final    Absolute Immature Granulocytes 05/08/2024 0.0  <=0.4 10e3/uL Final         Video-Visit Details    Type of service:  Video Visit   Originating Location (pt. Location): Home    Distant Location (provider location):  On-site  Platform used for Video Visit: [place]  Signed Electronically by: Tamela Boswell MD

## 2024-07-30 ENCOUNTER — MYC MEDICAL ADVICE (OUTPATIENT)
Dept: FAMILY MEDICINE | Facility: CLINIC | Age: 29
End: 2024-07-30

## 2024-07-30 ENCOUNTER — MYC REFILL (OUTPATIENT)
Dept: FAMILY MEDICINE | Facility: CLINIC | Age: 29
End: 2024-07-30

## 2024-07-30 DIAGNOSIS — T88.7XXA MEDICATION SIDE EFFECTS: Primary | ICD-10-CM

## 2024-07-30 DIAGNOSIS — F32.A ANXIETY AND DEPRESSION: ICD-10-CM

## 2024-07-30 DIAGNOSIS — G47.09 OTHER INSOMNIA: ICD-10-CM

## 2024-07-30 DIAGNOSIS — F41.9 ANXIETY AND DEPRESSION: ICD-10-CM

## 2024-07-30 PROCEDURE — 99207 EKG 12-LEAD COMPLETE W/READ - CLINICS: CPT | Performed by: INTERNAL MEDICINE

## 2024-07-30 RX ORDER — TRAZODONE HYDROCHLORIDE 50 MG/1
50 TABLET, FILM COATED ORAL AT BEDTIME
Qty: 90 TABLET | Refills: 0 | Status: SHIPPED | OUTPATIENT
Start: 2024-07-30

## 2024-07-31 ENCOUNTER — PRE VISIT (OUTPATIENT)
Dept: OTOLARYNGOLOGY | Facility: CLINIC | Age: 29
End: 2024-07-31

## 2024-07-31 ENCOUNTER — ALLIED HEALTH/NURSE VISIT (OUTPATIENT)
Dept: FAMILY MEDICINE | Facility: CLINIC | Age: 29
End: 2024-07-31
Payer: COMMERCIAL

## 2024-07-31 ENCOUNTER — OFFICE VISIT (OUTPATIENT)
Dept: OTOLARYNGOLOGY | Facility: CLINIC | Age: 29
End: 2024-07-31
Attending: INTERNAL MEDICINE
Payer: COMMERCIAL

## 2024-07-31 VITALS
HEART RATE: 87 BPM | SYSTOLIC BLOOD PRESSURE: 150 MMHG | BODY MASS INDEX: 25.76 KG/M2 | WEIGHT: 184 LBS | DIASTOLIC BLOOD PRESSURE: 87 MMHG | HEIGHT: 71 IN

## 2024-07-31 DIAGNOSIS — J34.2 NASAL SEPTAL DEVIATION: ICD-10-CM

## 2024-07-31 DIAGNOSIS — R06.83 SNORING: ICD-10-CM

## 2024-07-31 DIAGNOSIS — T88.7XXA MEDICATION SIDE EFFECTS: Primary | ICD-10-CM

## 2024-07-31 DIAGNOSIS — J34.89 NASAL OBSTRUCTION: Primary | ICD-10-CM

## 2024-07-31 DIAGNOSIS — R06.5 MOUTH BREATHING: ICD-10-CM

## 2024-07-31 PROCEDURE — 99203 OFFICE O/P NEW LOW 30 MIN: CPT | Mod: 25 | Performed by: OTOLARYNGOLOGY

## 2024-07-31 PROCEDURE — 92511 NASOPHARYNGOSCOPY: CPT | Performed by: OTOLARYNGOLOGY

## 2024-07-31 PROCEDURE — 99207 PR NO CHARGE NURSE ONLY: CPT

## 2024-07-31 RX ORDER — TRAZODONE HYDROCHLORIDE 50 MG/1
50 TABLET, FILM COATED ORAL AT BEDTIME
Qty: 90 TABLET | Refills: 0 | OUTPATIENT
Start: 2024-07-31

## 2024-07-31 NOTE — PROGRESS NOTES
Facial Plastic and Reconstructive Surgery Consultation    Referring Provider:   Tamela Boswell MD  [address]  [place] [address]    HPI:   I had the pleasure of seeing Fernando Del Castillo today in clinic for consultation for nasal obstruction and deviated nasal septum.    Fernando Del Castillo is a 28 year old with no significant past medical history who presented for nasal obstruction and congestion. He was also told by his PCP 3 years ago that he had a deviated septum. The patient reports difficulty with breathing through his nose and constant congestion, which has resulted in some anxiety about this. The patient snores at night and mouth breathes. He is unsure of which side is better, but reports occasionally maxillary sinus pain. The patient denies rhinorrhea. The patient has no known history of nasal trauma and denies cocaine use. He has never been evaluated by an allergist, but has tried allergy medications to try to alleviate his symptoms, but had no relief. The patient has also tried nasal steroid sprays for 3 months but had no benefit. His main goal is to improve his breathing, but otherwise likes the appearance of his nose.     The patient has a history of smoking cigarettes from 7655-2500 and would smoke a pack every two weeks. He also had a vaping history, but quit in 2022.         Review Of Systems  ROS: 10 point ROS neg other than the symptoms noted above in the HPI.    There is no problem list on file for this patient.    No past surgical history on file.  Current Outpatient Medications   Medication Sig Dispense Refill    finasteride (PROPECIA) 1 MG tablet Take 1 tablet (1 mg) by mouth daily 90 tablet 3    traZODone (DESYREL) 50 MG tablet Take 1 tablet (50 mg) by mouth at bedtime 90 tablet 0    amphetamine-dextroamphetamine (ADDERALL XR) 10 MG 24 hr capsule Take 10 mg by mouth (Patient not taking: Reported on 5/29/2024)      buPROPion (WELLBUTRIN XL) 150 MG 24 hr tablet Take 150 mg by mouth       triamcinolone (KENALOG) 0.1 % external ointment Apply to rash twice daily for 2-4 weeks, then stop. Restart if rash flares in the future. Not for groin, underarms, or face. 30 g 4     Patient has no known allergies.  Social History     Socioeconomic History    Marital status: Single     Spouse name: Not on file    Number of children: Not on file    Years of education: Not on file    Highest education level: Not on file   Occupational History    Not on file   Tobacco Use    Smoking status: Former     Current packs/day: 0.00     Average packs/day: 0.5 packs/day for 7.0 years (3.5 ttl pk-yrs)     Types: Cigarettes     Start date: 9/1/2013     Quit date: 9/1/2020     Years since quitting: 3.9    Smokeless tobacco: Former     Quit date: 12/29/2022   Vaping Use    Vaping status: Never Used   Substance and Sexual Activity    Alcohol use: Yes     Comment: OCASSIONAL    Drug use: Never    Sexual activity: Yes     Partners: Male     Birth control/protection: None   Other Topics Concern    Parent/sibling w/ CABG, MI or angioplasty before 65F 55M? No   Social History Narrative    Not on file     Social Determinants of Health     Financial Resource Strain: Low Risk  (4/4/2024)    Financial Resource Strain     Within the past 12 months, have you or your family members you live with been unable to get utilities (heat, electricity) when it was really needed?: No   Food Insecurity: Unknown (4/4/2024)    Food Insecurity     Within the past 12 months, did you worry that your food would run out before you got money to buy more?: Patient declined     Within the past 12 months, did the food you bought just not last and you didn t have money to get more?: Patient declined   Transportation Needs: Low Risk  (4/4/2024)    Transportation Needs     Within the past 12 months, has lack of transportation kept you from medical appointments, getting your medicines, non-medical meetings or appointments, work, or from getting things that you need?:  No   Physical Activity: Inactive (3/8/2023)    [facility]    Exercise Vital Sign     Days of Exercise per Week: 0 days     Minutes of Exercise per Session: 0 min   Stress: Stress Concern Present (3/8/2023)    [facility]    [employer] - Occupational Stress Questionnaire     Feeling of Stress : To some extent   Social Connections: Moderately Isolated (3/8/2023)    [facility]    Social Connection and Isolation Panel [NHANES]     Frequency of Communication with Friends and Family: Twice a week     Frequency of Social Gatherings with Friends and Family: Once a week     Attends [employer] Services: Never     Active Member of Clubs or Organizations: No     Attends Club or Organization Meetings: Never     Marital Status: Living with partner   Interpersonal Safety: Low Risk  (4/5/2024)    Interpersonal Safety     Do you feel physically and emotionally safe where you currently live?: Yes     Within the past 12 months, have you been hit, slapped, kicked or otherwise physically hurt by someone?: No     Within the past 12 months, have you been humiliated or emotionally abused in other ways by your partner or ex-partner?: No   Housing Stability: Low Risk  (4/4/2024)    Housing Stability     Do you have housing? : Yes     Are you worried about losing your housing?: No     Family History   Problem Relation Age of Onset    Depression Mother     Anxiety Disorder Mother     Mental Illness Mother     Hyperlipidemia Father     Lupus Sister     Other Cancer Brother     Diabetes Maternal Grandmother     Obesity Maternal Grandmother        PE:  Alert and Oriented, Answering Questions Appropriately  Atraumatic, Normocephalic, Face Symmetric  Skin: Leonard 1  Facial Nerve Intact and facial movement  symmetric  EOM's, PEERL  Nasal Exam: Nasal dorsum deviation to the right. No mucopurulence or polyps, no masses  Chin: Normal   Lips/Teeth/Toungue/Gums: Lips intact, Normal Dentition, Occlusion intact, Oral mucosa intact, no lesions/ulcerations/masses, Tongue mobile  OP: Palate elevates with speech, Mallampati I, Uvula midline  Neck: trachea midline  Card: not diaphoretic  Neuro/Psych: CN's 2-12 intact, Moves all extremities, positive affect, no notable muscle weakness          PROCEDURE:Diagnostic Nasal Endoscopy   Indication: Nasal obstruction  Performed by: Nadja Parrish     Consent was obtained. 0 degree sinus endoscope was used. Nasal Endoscopy is performed to provide a more thorough evaluation of the nasal airway than seen on standard nasal speculum exam.  Findings are as follows:     Caudal septum deviated to the right with compensatory inferior turbinate hypertrophy. Body of septum deviated to the left with 60% obstruction.             Imaging: None      IMPRESSION/PLAN    Diagnoses of Nasal septal deviation, Snoring, and Mouth breathing were pertinent to this visit.  Fernando Del Castillo is a 28 year old male here for nasal obstruction and nasal septal deviation. Physical exam notable for caudal septal deviation to the right with compensatory inferior turbinate hypertrophy and body of septum deviated to the left. The patient has used nasal steroid sprays for at least 3 months and tried allergy medication and had no benefit.     Patient is an appropriate candidate for a septorhinoplasty to correct and alleviate nasal obstruction. Discussed the procedure, risks, benefits, and postoperative peroid with the patient. Discussed that this is not meant to address the congestion and possible allergic symptoms. Patient understood and would like to proceed.       Photodocumentation was obtained.     I spent a total of 30 minutes in the care of patient Fernando Del Castillo during today's office visit. This time includes  reviewing the patient's chart and prior history, obtaining a history, performing an examination and evaluation and counseling the patient. This time also includes ordering mediations or tests necessary in addition to communication to other member's of the patient's health care team. Time spent in documentation and care coordination is included.     Corazon Saleem, MS4     I, Nadja Parrish MD, was present with the medical student who participated in the service and in the documentation of the note.  I have verified the history and personally performed the physical exam and medical decision making.  I agree with the assessment and plan of care as documented in the note.

## 2024-08-06 ENCOUNTER — PREP FOR PROCEDURE (OUTPATIENT)
Dept: OTOLARYNGOLOGY | Facility: CLINIC | Age: 29
End: 2024-08-06
Payer: COMMERCIAL

## 2024-08-06 DIAGNOSIS — J34.829 NASAL VALVE COLLAPSE: ICD-10-CM

## 2024-08-06 DIAGNOSIS — J34.2 NASAL SEPTAL DEVIATION: ICD-10-CM

## 2024-08-06 DIAGNOSIS — J34.89 NASAL OBSTRUCTION: Primary | ICD-10-CM

## 2024-08-06 DIAGNOSIS — J34.3 HYPERTROPHY OF INFERIOR NASAL TURBINATE: ICD-10-CM

## 2024-08-06 RX ORDER — CEFAZOLIN SODIUM 2 G/50ML
2 SOLUTION INTRAVENOUS SEE ADMIN INSTRUCTIONS
OUTPATIENT
Start: 2024-08-06

## 2024-08-06 RX ORDER — CEFAZOLIN SODIUM 2 G/50ML
2 SOLUTION INTRAVENOUS
OUTPATIENT
Start: 2024-08-06

## 2024-08-07 NOTE — NURSING NOTE
Photodocumentation obtained.    Will place orders for nasal surgery with Dr. Parrish.    Noemi Rai RN  8/6/2024 8:13 PM

## 2024-08-08 ENCOUNTER — TELEPHONE (OUTPATIENT)
Dept: PLASTIC SURGERY | Facility: CLINIC | Age: 29
End: 2024-08-08

## 2024-08-08 PROBLEM — J34.89 NASAL OBSTRUCTION: Status: ACTIVE | Noted: 2024-08-06

## 2024-08-08 PROBLEM — J34.3 HYPERTROPHY OF INFERIOR NASAL TURBINATE: Status: ACTIVE | Noted: 2024-08-06

## 2024-08-08 PROBLEM — J34.2 NASAL SEPTAL DEVIATION: Status: ACTIVE | Noted: 2024-08-06

## 2024-08-08 PROBLEM — J34.829 NASAL VALVE COLLAPSE: Status: ACTIVE | Noted: 2024-08-06

## 2024-08-08 NOTE — TELEPHONE ENCOUNTER
Called patient to schedule surgery with Dr. Parrish. No answer, callback number [phone] left on .     Eve Ramirez on 8/8/2024 at 12:35 PM

## 2024-08-08 NOTE — TELEPHONE ENCOUNTER
Patient is scheduled for surgery with Dr. Parrish.     Spoke with: Patient     Date of Surgery: 1/16/25, patients asked to be scheduled into mid January.     Location: [facility] OR     Pre op with Provider: BLAINE     H&P: Per patients request, scheduled for an in clinic visit with PAC on 1/07/25 at 2:00.     Additional imaging/appointments: Patient is scheduled for a 1 week nurse visit on 1/22/25 at 9:30 am.     Surgery packet: Will mail packet, confirmed with patient address in chart works best. Patient made aware arrival time for surgery will not be listed within packet.      Additional comments: BLANIE Ramirez on 8/8/2024 at 2:42 PM

## 2024-08-26 PROBLEM — R06.83 SNORING: Status: ACTIVE | Noted: 2024-08-26

## 2024-08-26 PROBLEM — R06.5 MOUTH BREATHING: Status: ACTIVE | Noted: 2024-08-26

## 2024-10-09 NOTE — TELEPHONE ENCOUNTER
FUTURE VISIT INFORMATION      SURGERY INFORMATION:  Date: 25  Location:  or  Surgeon:  Nadja Parrish MD   Anesthesia Type:  general  Procedure: Septoplasty, Repair of Nasal Vestibular Stenosis, Bilateral Inferior Turbinate Reduction   Consult: ov 24    RECORDS REQUESTED FROM:       Primary Care Provider: Tamela Boswell MD - [facility]    Most recent EKG+ [id number]

## 2024-11-06 ASSESSMENT — ANXIETY QUESTIONNAIRES
1. FEELING NERVOUS, ANXIOUS, OR ON EDGE: NEARLY EVERY DAY
3. WORRYING TOO MUCH ABOUT DIFFERENT THINGS: MORE THAN HALF THE DAYS
7. FEELING AFRAID AS IF SOMETHING AWFUL MIGHT HAPPEN: SEVERAL DAYS
4. TROUBLE RELAXING: NEARLY EVERY DAY
IF YOU CHECKED OFF ANY PROBLEMS ON THIS QUESTIONNAIRE, HOW DIFFICULT HAVE THESE PROBLEMS MADE IT FOR YOU TO DO YOUR WORK, TAKE CARE OF THINGS AT HOME, OR GET ALONG WITH OTHER PEOPLE: SOMEWHAT DIFFICULT
7. FEELING AFRAID AS IF SOMETHING AWFUL MIGHT HAPPEN: SEVERAL DAYS
2. NOT BEING ABLE TO STOP OR CONTROL WORRYING: MORE THAN HALF THE DAYS
GAD7 TOTAL SCORE: 16
GAD7 TOTAL SCORE: 16
6. BECOMING EASILY ANNOYED OR IRRITABLE: MORE THAN HALF THE DAYS
5. BEING SO RESTLESS THAT IT IS HARD TO SIT STILL: NEARLY EVERY DAY
8. IF YOU CHECKED OFF ANY PROBLEMS, HOW DIFFICULT HAVE THESE MADE IT FOR YOU TO DO YOUR WORK, TAKE CARE OF THINGS AT HOME, OR GET ALONG WITH OTHER PEOPLE?: SOMEWHAT DIFFICULT
GAD7 TOTAL SCORE: 16

## 2024-11-06 ASSESSMENT — PATIENT HEALTH QUESTIONNAIRE - PHQ9
SUM OF ALL RESPONSES TO PHQ QUESTIONS 1-9: 11
SUM OF ALL RESPONSES TO PHQ QUESTIONS 1-9: 11
10. IF YOU CHECKED OFF ANY PROBLEMS, HOW DIFFICULT HAVE THESE PROBLEMS MADE IT FOR YOU TO DO YOUR WORK, TAKE CARE OF THINGS AT HOME, OR GET ALONG WITH OTHER PEOPLE: SOMEWHAT DIFFICULT

## 2024-11-07 ENCOUNTER — VIRTUAL VISIT (OUTPATIENT)
Dept: PSYCHOLOGY | Facility: CLINIC | Age: 29
End: 2024-11-07
Attending: INTERNAL MEDICINE
Payer: COMMERCIAL

## 2024-11-07 DIAGNOSIS — F90.9 ATTENTION DEFICIT HYPERACTIVITY DISORDER (ADHD), UNSPECIFIED ADHD TYPE: ICD-10-CM

## 2024-11-07 DIAGNOSIS — F32.89 OTHER DEPRESSION: ICD-10-CM

## 2024-11-07 DIAGNOSIS — G47.09 OTHER INSOMNIA: ICD-10-CM

## 2024-11-07 DIAGNOSIS — F41.1 GENERALIZED ANXIETY DISORDER: Primary | ICD-10-CM

## 2024-11-07 PROCEDURE — 90791 PSYCH DIAGNOSTIC EVALUATION: CPT | Mod: 95 | Performed by: MARRIAGE & FAMILY THERAPIST

## 2024-11-07 NOTE — PROGRESS NOTES
"    [facility]         PATIENT'S NAME: Fernando Del Castillo  PREFERRED NAME: Fernando  PRONOUNS: He/Him/His  MRN: [id number]  : 1995  ADDRESS: [address] [place] [address]  ACCT. NUMBER:  [id number]  DATE OF SERVICE: 24  START TIME: 1435  END TIME: 1535  PREFERRED PHONE: [phone]  May we leave a program related message: Yes  EMERGENCY CONTACT: was obtained partner's name is listed .  SERVICE MODALITY:  Video Visit:      Provider verified identity through the following two step process.  Patient provided:  Patient photo, Patient , and Patient address    Telemedicine Visit: The patient's condition can be safely assessed and treated via synchronous audio and visual telemedicine encounter.      Reason for Telemedicine Visit: Patient has requested telehealth visit    Originating Site (Patient Location): Patient's home    Distant Site (Provider Location): [facility]    Consent:  The patient/guardian has verbally consented to: the potential risks and benefits of telemedicine (video visit) versus in person care; bill my insurance or make self-payment for services provided; and responsibility for payment of non-covered services.     Patient would like the video invitation sent by:  My Chart    Mode of Communication:  Video Conference via [facility]    Distant Location (Provider):  On-site    As the provider I attest to compliance with applicable laws and regulations related to telemedicine.    UNIVERSAL ADULT Mental Health DIAGNOSTIC ASSESSMENT    Identifying Information:  Patient is a 29 year old,  individual.  Patient was referred for an assessment by self and primary care clinic.  Patient attended the session alone.    Chief Complaint:   The reason for seeking services at this time is: \"Anxiety / Depression\".  The problem(s) began 21.    Patient has attempted to resolve these concerns in the past through individual psychotherapy sessions " a couple of years ago .    Social/Family History:  Patient reported they grew up in other [place], [place].  They were raised by biological parents  .  Parents one or both remarried.  Patient reported that their childhood was complicated.  Patient described their current relationships with family of origin as positive.     The patient describes their cultural background as .  Cultural influences and impact on patient's life structure, values, norms, and healthcare: [place]  Contextual influences on patient's health include: Contextual Factors: Societal Factors   .    These factors will be addressed in the Preliminary Treatment plan. Patient identified their preferred language to be English. Patient reported they does not need the assistance of an  or other support involved in therapy.     Patient reported had no significant delays in developmental tasks.   Patient's highest education level was college graduate  .  Patient identified the following learning problems: none reported.  Modifications will not be used to assist communication in therapy. Patient reports they are  able to understand written materials.    Patient reported the following relationship history committed relationship, wishing to .  Patient's current relationship status is has a partner or significant other.   Patient identified their sexual orientation as villalobos.  Patient reported having   child(bull). Patient identified partner; mother; friends as part of their support system.  Patient identified the quality of these relationships as poor,  .      Patient's current living/housing situation involves staying in own home/apartment.  The immediate members of family and household include Leah Mcclelland,Boyfriend and they report that housing is stable.    Patient is currently employed fulltime.  Patient reports their finances are obtained through employment. Patient does identify finances as a  current stressor.      Patient reported that they have not been involved with the legal system. Patient does not report being under probation/ parole/ jurisdiction. They are not under any current court jurisdiction. .    Patient's Strengths and Limitations:  Patient identified the following strengths or resources that will help them succeed in treatment: commitment to health and well being, friends / good social support, insight, intelligence, motivation, and sense of humor. Things that may interfere with the patient's success in treatment include: none identified.     Assessments:  The following assessments were completed by patient for this visit:  PHQ9:       4/4/2024    11:49 AM 5/29/2024     4:33 PM 11/6/2024     3:21 PM   PHQ-9 SCORE   PHQ-9 Total Score MyChart 18 (Moderately severe depression)  11 (Moderate depression)   PHQ-9 Total Score 18 9 11        Patient-reported     GAD7:       11/6/2024     3:49 PM   VANNESSA-7 SCORE   Total Score 16 (severe anxiety)   Total Score 16        Patient-reported     CAGE-AID:       11/6/2024     3:50 PM   CAGE-AID Total Score   Total Score 0    Total Score MyChart 0 (A total score of 2 or greater is considered clinically significant)       Patient-reported     PROMIS 10-Global Health (only subscores and total score):       11/6/2024     3:50 PM   PROMIS-10 Scores Only   Global Mental Health Score 7    Global Physical Health Score 12    PROMIS TOTAL - SUBSCORES 19        Patient-reported     [place] Suicide Severity Rating Scale (Lifetime/Recent)       No data to display                Personal and Family Medical History:  Patient does report a family history of mental health concerns.  Patient reports family history includes Anxiety Disorder in his mother; Depression in his mother; Diabetes in his maternal grandmother; Hyperlipidemia in his father; Lupus in his sister; Mental Illness in his mother; Obesity in his maternal grandmother; Other Cancer in his brother..     Patient  does report Mental Health Diagnosis and/or Treatment.  Patient reported the following previous diagnoses which include(s): an anxiety disorder; depression .  Patient reported symptoms began years ago.  Patient has received mental health services in the past:  therapy  .  Psychiatric Hospitalizations: none when   ,  ,  ,  ,  ,  ,  ,  ,  ,  ,  .    Patient denies a history of civil commitment.      Currently, patient none  is receiving other mental health services.  These include none.       Patient has had a physical exam to rule out medical causes for current symptoms.  Date of last physical exam was within the past year. Client was encouraged to follow up with PCP if symptoms were to develop. The patient has a [facility] Provider, who is named Tamela Boswell..  Patient reports  high blood pressure readings, without yet being diagnoses with hypertension .  Patient reports pain concerns including lethargy and slowed movements with had tension.  Patient does not want help addressing pain concerns..   There are not significant appetite / nutritional concerns / weight changes.   Patient does not report a history of head injury / trauma / cognitive impairment.      Patient reports current meds as:   Current Outpatient Medications   Medication Sig Dispense Refill    amphetamine-dextroamphetamine (ADDERALL XR) 10 MG 24 hr capsule Take 10 mg by mouth (Patient not taking: Reported on 5/29/2024)      buPROPion (WELLBUTRIN XL) 150 MG 24 hr tablet Take 150 mg by mouth      finasteride (PROPECIA) 1 MG tablet Take 1 tablet (1 mg) by mouth daily 90 tablet 3    traZODone (DESYREL) 50 MG tablet Take 1 tablet (50 mg) by mouth at bedtime 90 tablet 0    triamcinolone (KENALOG) 0.1 % external ointment Apply to rash twice daily for 2-4 weeks, then stop. Restart if rash flares in the future. Not for groin, underarms, or face. 30 g 4     No current facility-administered medications for this visit.       Medication  Adherence:  Patient reports taking.  taking psychiatric medications as prescribed.    Patient Allergies:  No Known Allergies    Medical History:    Past Medical History:   Diagnosis Date    Other depression 11/7/2024         Current Mental Status Exam:   Appearance:  Appropriate    Eye Contact:  Good   Psychomotor:  Restless       Gait / station:  no problem  Attitude / Demeanor: Cooperative  Pleasant  Speech      Rate / Production: Talkative      Volume:  Normal  volume      Language:  intact and no obvious problem  Mood:   Anxious  Fearful  Affect:   Worrisome    Thought Content: Clear   Thought Process: Coherent  Goal Directed  Logical       Associations: No loosening of associations  Insight:   Good  and Intellectual Insight  Judgment:  Intact   Orientation:  Person Place Time Situation  Attention/concentration: Fair and Easily Distracted    Substance Use:   Patient did not report a family history of substance use concerns; see medical history section for details.  Patient has not received chemical dependency treatment in the past.  Patient has not ever been to detox.      Patient is not currently receiving any chemical dependency treatment.           Substance History of use Age of first use Date of last use     Pattern and duration of use (include amounts and frequency)   Alcohol currently use   13 11/05/24 REPORTS SUBSTANCE USE: reports using substance 1 times per month and has minimal amounts 1 alcoholic beverage at a time.   Patient reports heaviest use is current use.   Cannabis   never used     REPORTS SUBSTANCE USE: N/A     Amphetamines   never used     REPORTS SUBSTANCE USE: N/A   Cocaine/crack    never used       REPORTS SUBSTANCE USE: N/A   Hallucinogens never used         REPORTS SUBSTANCE USE: N/A   Inhalants never used         REPORTS SUBSTANCE USE: N/A   Heroin never used         REPORTS SUBSTANCE USE: N/A   Other Opiates never used     REPORTS SUBSTANCE USE: N/A   Benzodiazepine   never used      REPORTS SUBSTANCE USE: N/A   Barbiturates never used     REPORTS SUBSTANCE USE: N/A   Over the counter meds never used     REPORTS SUBSTANCE USE: N/A   Caffeine currently use 14   Not specified at this interview   Nicotine  used in the past 11 12/31/21 REPORTS SUBSTANCE USE: N/A   Other substances not listed above:  Identify:  never used     REPORTS SUBSTANCE USE: N/A     Patient reported the following problems as a result of their substance use: no problems, not applicable.    Substance Use: No symptoms    Based on the CAGE score of 0 and clinical interview there  are not indications of drug or alcohol abuse.    Significant Losses / Trauma / Abuse / Neglect Issues:   Patient did not serve in the .  There are indications or report of significant loss, trauma, abuse or neglect issues related to: death of older brother 1 year prior to today's session. Multiple changes in family structure due to divorce and blending of family .  Concerns for possible neglect are not present.     Safety Assessment:   Patient denies current homicidal ideation and behaviors.  Patient denies current self-injurious ideation and behaviors.    Patient denied risk behaviors associated with substance use.   Patient denies any high risk behaviors associated with mental health symptoms.  Patient reports the following current concerns for their personal safety: None.  Patient reports there are not firearms in the house.       History of Safety Concerns:  Patient denied a history of homicidal ideation.     Patient denied a history of personal safety concerns.    Patient denied a history of assaultive behaviors.    Patient denied a history of sexual assault behaviors.     Patient denied a history of risk behaviors associated with substance use.  Patient denies any history of high risk behaviors associated with mental health symptoms.  Patient reports the following protective factors: hopeful, access to and engagement with healthcare, strong  bond to family unit, community, job, school, etc, supportive social network or family, lives in a responsibly safe environment, and responsibilities to others daily obligations; sense of personal control or determination    Risk Plan:  See Recommendations for Safety and Risk Management Plan    Review of Symptoms per patient report:   Depression: Feeling sad, down, or depressed, Change in energy level, Change in sleep, Psychomotor slowing or agitation, Ruminations, and Irritability  Liza:  No Symptoms  Psychosis: No Symptoms  Anxiety: Excessive worry, Nervousness, Physical complaints, such as headaches, stomachaches, muscle tension, Sleep disturbance, Psychomotor agitation, Ruminations, Poor concentration, and Irritability  Panic:  No symptoms  Post Traumatic Stress Disorder:  No Symptoms   Eating Disorder: No Symptoms  ADD / ADHD:  Inattentive, Difficulties listening, and Poor task completion  Conduct Disorder: No symptoms  Autism Spectrum Disorder: No symptoms  Obsessive Compulsive Disorder: No Symptoms    Patient reports the following compulsive behaviors and treatment history: NA.      Diagnostic Criteria:   Generalized Anxiety Disorder  A. Excessive anxiety and worry about a number of events or activities (such as work or school performance).   B. The person finds it difficult to control the worry.  C. Select 3 or more symptoms (required for diagnosis). Only one item is required in children.   - Restlessness or feeling keyed up or on edge.    - Being easily fatigued.    - Difficulty concentrating or mind going blank.    - Irritability.    - Muscle tension.    - Sleep disturbance (difficulty falling or staying asleep, or restless unsatisfying sleep).   D. The focus of the anxiety and worry is not confined to features of an Axis I disorder.  E. The anxiety, worry, or physical symptoms cause clinically significant distress or impairment in social, occupational, or other important areas of functioning.   F. The  disturbance is not due to the direct physiological effects of a substance (e.g., a drug of abuse, a medication) or a general medical condition (e.g., hyperthyroidism) and does not occur exclusively during a Mood Disorder, a Psychotic Disorder, or a Pervasive Developmental Disorder.    - The aformentioned symptoms began several  year(s) ago and occurs 7 days per week and is experienced as moderate.    Other Depressive Disorder  (A-C) Clinical symptoms:   - Depressed mood.    - Diminished interest or pleasure in all, or almost all, activities.    - Decreased sleep.    - Fatigue or loss of energy.   B) The symptoms cause clinically significant distress or impairment in social, occupational, or other important areas of functioning  C) The episode is not attributable to the physiological effects of a substance or to another medical condition  D) The occurence of major depressive episode is not better explained by other thought / psychotic disorders  E) There has never been a manic episode or hypomanic episode     Other Insomnia:   Sleep reduction difficulties falling asleep most often due to tension from the day.    Attention Deficit Hyperactivity Disorder, Unspecified   A) A persistent pattern of inattention and/or hyperactivity-impulsivity that interferes with functioning or development, as characterized by (1) Inattention and/or (2) Hyperactivity and Impulsivity  - Often fails to give close attention to details or makes careless mistakes in schoolwork, at work, or during other activities  - Often has difficulty sustaining attention in tasks or play activities  - Often does not seem to listen when spoken to directly  - Often does not follow through on instructions and fails to finish schoolwork, chores, or duties in the workplace  - Often has difficulty organizing tasks and activities  - Often avoids, dislikes, or is reluctant to engage in tasks that require sustained mental effort  - Often loses things necessary for  "tasks or activities  - Is often easily distractedby extraneous stimuli  - Is often forgetful in daily activities  (2) Hyeractivity and Impulsivity: 6 or more of the following symptoms have persisted for at least 6 months to a degree that is inconsistent with developmental level and that negatively impacts directly on social and academic/occupational activities:  - Is often \"on the go,\" acting as if \"driven by a motor\"  - Often talks excessively  - Often has difficulty waiting his or her turn  B) Several inattentive or hyperactive-impulsive symptoms were present prior to age 10 years  C) Several inattentive or hyperactive-impulsive symptoms are present in two or more settings  D) There is clear evidence that the symptoms interfere with, or reduce the quality of, social academic, or occupational functioning  E) The Symptoms do not occur exclusively during the course of schizophrenia or another psychotic disorder and are not better explained by another mental disorder     Functional Status:  Patient reports the following functional impairments:  academic performance, management of the household and or completion of tasks, and organization.     Nonprogrammatic care:  Patient is requesting basic services to address current mental health concerns.    Clinical Summary:  1. Psychosocial, Cultural and Contextual Factors: Social dynamics that are oppressive of verma rights, sleep difficulties, inattention.  2. Principal DSM5 Diagnoses  (Sustained by DSM5 Criteria Listed Above):  Encounter Diagnoses   Name Primary?    Generalized anxiety disorder Yes    Other depression     Other insomnia     Attention deficit hyperactivity disorder (ADHD), unspecified ADHD type    3. Other Diagnoses that is relevant to services:   NA.  4. Provisional Diagnosis:  NA  5. Prognosis: Expect Improvement and Relieve Acute Symptoms.  6. Likely consequences of symptoms if not treated: intensification of worries and depression symptoms.  7. Client " strengths include:  educated, employed, has a previous history of therapy, intelligent, motivated, support of family, friends and providers, and work history .     Recommendations:     1. Plan for Safety and Risk Management:   Safety and Risk: Recommended that patient call 911 or go to the local ED should there be a change in any of these risk factors.          Report to child / adult protection services was NA.     2. Patient's identified sexual orientation concerns will be addressed by awareness and attention to context of the client .     3. Initial Treatment will focus on:    Depressed Mood -    Anxiety -    Attentional Problems -   .     4. Resources/Service Plan:    services are not indicated.   Modifications to assist communication will be used for therapy. ADHD-friendly approaches   Additional disability accommodations are not indicated.      5. Collaboration:   Collaboration / coordination of treatment will be initiated with the following  support professionals: primary care physician.      6.  Referrals:   The following referral(s) will be initiated: Outpatient [facility].       A Release of Information has been obtained for the following:  NA, previous provider was not identified by the client .     Clinical Substantiation/medical necessity for the above recommendations:  Client meets clinically significant symptoms of general anxiety, depression, ADHD, and insomnia    7. ALEJANDRA:    ALEJANDRA:  Discussed the general effects of drugs and alcohol on health and well-being. Provider gave patient printed information about the  effects of chemical use on their health and well being. Recommendations:  abstinance during psychotherapy participation .     8. Records:   These were not available for review at time of assessment.   Information in this assessment was obtained from the medical record and  provided by patient who is a good historian.    Patient will have open access to their mental health  medical record.    9.   Interactive Complexity: No    10. Safety Plan:       Provider Name/ Credentials:  Salomón Rivera. [facility]  November 7, 2024        Answers submitted by the patient for this visit:  Patient Health Questionnaire (Submitted on 11/6/2024)  If you checked off any problems, how difficult have these problems made it for you to do your work, take care of things at home, or get along with other people?: Somewhat difficult  PHQ9 TOTAL SCORE: 11  Patient Health Questionnaire (G7) (Submitted on 11/6/2024)  VANNESSA 7 TOTAL SCORE: 16

## 2024-11-15 ENCOUNTER — VIRTUAL VISIT (OUTPATIENT)
Dept: PSYCHOLOGY | Facility: CLINIC | Age: 29
End: 2024-11-15
Payer: COMMERCIAL

## 2024-11-15 DIAGNOSIS — F90.9 ATTENTION DEFICIT HYPERACTIVITY DISORDER (ADHD), UNSPECIFIED ADHD TYPE: ICD-10-CM

## 2024-11-15 DIAGNOSIS — F41.1 GENERALIZED ANXIETY DISORDER: Primary | ICD-10-CM

## 2024-11-15 DIAGNOSIS — G47.09 OTHER INSOMNIA: ICD-10-CM

## 2024-11-15 DIAGNOSIS — F32.89 OTHER DEPRESSION: ICD-10-CM

## 2024-11-15 PROCEDURE — 90837 PSYTX W PT 60 MINUTES: CPT | Mod: 95 | Performed by: MARRIAGE & FAMILY THERAPIST

## 2024-11-15 NOTE — PROGRESS NOTES
Attention Deficit Hyperactivity Disorder, Unspecified           [facility]                                     Progress Note    Patient Name: Fernando Del Castillo  Date: 11/15/24           Service Type: Individual      Session Start Time: 0800  Session End Time: 0855     Session Length: 55 minutes    Session #: 1    Attendees: Client attended alone    Service Modality:  Video Visit:      Provider verified identity through the following two step process.  Patient provided:  Patient is known previously to provider and Patient was verified at admission/transfer    Telemedicine Visit: The patient's condition can be safely assessed and treated via synchronous audio and visual telemedicine encounter.      Reason for Telemedicine Visit: Patient has requested telehealth visit    Originating Site (Patient Location): Patient's place of employment    Distant Site (Provider Location): [facility]    Consent:  The patient/guardian has verbally consented to: the potential risks and benefits of telemedicine (video visit) versus in person care; bill my insurance or make self-payment for services provided; and responsibility for payment of non-covered services.     Patient would like the video invitation sent by:  My Chart    Mode of Communication:  Video Conference via [facility]    Distant Location (Provider):  On-site    As the provider I attest to compliance with applicable laws and regulations related to telemedicine.    DATA  Interactive Complexity: No  Crisis: No    Extended Session (53+ minutes): PROLONGED SERVICE IN THE OUTPATIENT SETTING REQUIRING DIRECT (FACE-TO-FACE) PATIENT CONTACT BEYOND THE USUAL SERVICE:    - Longer session due to limited access to mental health appointments and necessity to address patient's distress / complexity    - Patient's presenting concerns require more intensive intervention than could be completed within the usual service      Progress Since Last Session  (Related to Symptoms / Goals / Homework):   Symptoms: No change Continues to deal with VANNESSA, Insomnia, Depression, and ADHD.      Homework: Completed in session      Episode of Care Goals: No improvement - ACTION (Actively working towards change); Intervened by reinforcing change plan / affirming steps taken     Current / Ongoing Stressors and Concerns:   Fernando is a  29-year-old,  cisgender male residing in [place], [place] presenting to individual psychotherapy follow-up session to address anxious symptoms including excessive worry and rumination, as well as depression, insomnia, and ADHD symptoms that interfere with his functioning.  Specific anxiety symptoms include difficulties with worrying what others think of him within social interactions including family interactions. Contextual/Cultural factors: Social dynamics that are oppressive of verma rights and neuro diversity.    Depression: Feeling sad, down, or depressed, Change in energy level, Change in sleep, Psychomotor slowing or agitation, Ruminations, and Irritability  Anxiety: Excessive worry, Nervousness, Physical complaints, such as headaches, stomachaches, muscle tension, Sleep disturbance, Psychomotor agitation, Ruminations, Poor concentration, and Irritability occurring 7 days per week to a Moderate severity  ADD / ADHD: Inattentive, Difficulties listening, and Poor task completion  Other Insomnia: Sleep reduction difficulties falling asleep most often due to tension from the day.       Treatment Objective(s) Addressed in This Session:   identify and compliment positives at least 2 times daily to support positive self-image  use relaxation strategies 5 times per day to reduce the physical symptoms of anxiety  Increase interest, engagement, and pleasure in doing things  Decrease frequency and intensity of feeling down, depressed, hopeless  Improve quantity and quality of night time sleep / decrease daytime naps  Improve diet, appetite,  "mindful eating, and / or meal planning  Identify negative self-talk and behaviors: challenge core beliefs, myths, and actions  sleep at least 6.5 hours per night for next 90 days      Anxiety within interactions with friends and families, avoidance,    Having energy    Shyness in school, bullying (flaws getting printed out by mom; More confident and argumentative than what is allowed per family interactions)   Stan high and through college: fitness image, and cognition.  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, Furniture sales and management    Having too many work demands taxes his brain      45 minutes could be used for physical activity mindfulness strategies  He discussed having aversion to most children including thoughts \"they are disgusting\" feelings with germs   Family interactions with reported, as his family supported his younger brother as he faced legal issues, other siblings including a sister that was notably not at their home (she lived with her father as a minor), and older brother that      Discussed having difficulties letting go of the day's stress and repeating sounds in his mind, both of which have negatively impacted his sleep: and is now being prescribed trazodone  Attention balance steady com     Intervention:   Motivational Interviewing  MI Intervention: Expressed Empathy/Understanding, Supported Autonomy, Collaboration, Evocation, Permission to raise concern or advise, Open-ended questions, Reflections: simple and complex, Change talk (evoked), and Reframe   Change Talk Expressed by the Patient: Desire to change Ability to change Reasons to change Need to change Committment to change Activation Taking steps  Provider Response to Change Talk: E - Evoked more info from patient about behavior change, A - Affirmed patient's thoughts, decisions, or attempts at behavior change, R - Reflected patient's change talk, and S - Summarized " patient's change talk statements    Assessments completed prior to visit:  The following assessments were completed by patient for this visit:  PHQ9:       4/4/2024    11:49 AM 5/29/2024     4:33 PM 11/6/2024     3:21 PM   PHQ-9 SCORE   PHQ-9 Total Score MyChart 18 (Moderately severe depression)  11 (Moderate depression)   PHQ-9 Total Score 18 9 11        Patient-reported     GAD7:       11/6/2024     3:49 PM   VANNESSA-7 SCORE   Total Score 16 (severe anxiety)   Total Score 16        Patient-reported     CAGE-AID:       11/6/2024     3:50 PM   CAGE-AID Total Score   Total Score 0    Total Score MyChart 0 (A total score of 2 or greater is considered clinically significant)       Patient-reported     PROMIS 10-Global Health (only subscores and total score):       11/6/2024     3:50 PM   PROMIS-10 Scores Only   Global Mental Health Score 7    Global Physical Health Score 12    PROMIS TOTAL - SUBSCORES 19        Patient-reported     [place] Suicide Severity Rating Scale (Lifetime/Recent)      11/18/2024     8:53 AM   [place] Suicide Severity Rating (Lifetime/Recent)   Q1 Wish to be Dead (Lifetime) N   Q2 Non-Specific Active Suicidal Thoughts (Lifetime) N   Actual Attempt (Lifetime) N   Has subject engaged in non-suicidal self-injurious behavior? (Lifetime) N   Interrupted Attempts (Lifetime) N   Aborted or Self-Interrupted Attempt (Lifetime) N   Preparatory Acts or Behavior (Lifetime) N   Calculated C-SSRS Risk Score (Lifetime/Recent) No Risk Indicated            ASSESSMENT: Current Emotional / Mental Status (status of significant symptoms):   Risk status (Self / Other harm or suicidal ideation)   Patient denies current fears or concerns for personal safety.   Patient denies current or recent suicidal ideation or behaviors.   Patient denies current or recent homicidal ideation or behaviors.   Patient denies current or recent self injurious behavior or ideation.   Patient denies other safety concerns.   Patient reports  there has been no change in risk factors since their last session.     Patient reports there has been no change in protective factors since their last session.     Recommended that patient call 911 or go to the local ED should there be a change in any of these risk factors     Appearance:   Professionally dressed for work    Eye Contact:   Good    Psychomotor Behavior: Normal    Attitude:   Cooperative  Friendly Pleasant   Orientation:   Person Place Time Situation   Speech    Rate / Production: Talkative Normal     Volume:  Normal    Mood:    Anxious  Sad  Ambivalence   Affect:    Subdued  Worrisome    Thought Content:  Rumination    Thought Form:  Coherent  Goal Directed  Logical    Insight:    Good      Medication Review:   No changes to current psychiatric medication(s)     Medication Compliance:   Yes     Changes in Health Issues:   Yes: Symptoms being addressed through the use of trazodone   Chemical Use Review:   Substance Use: Chemical use reviewed, no active concerns identified      Tobacco Use: No current tobacco use.      Diagnosis:  1. Generalized anxiety disorder    2. Other depression    3. Other insomnia    4. Attention deficit hyperactivity disorder (ADHD), unspecified ADHD type        Collateral Reports Completed:   Not Applicable    PLAN: (Patient Tasks / Therapist Tasks / Other)  Fernando will work on identifying more specifics regarding his desired treatment plan goals  Fernando will use his basic coping strategies including: Read, coffee, self-care    Continue addressing treatment plan goals        RICCARDO Mcleod                                                         ______________________________________________________________________    Individual Treatment Plan    Patient's Name: Fernando GONG Abundio  YOB: 1995    Date of Creation: 11/15/2024  Date Treatment Plan Last Reviewed/Revised: 11/15/2024    DSM5 Diagnoses:   Encounter Diagnoses   Name Primary?    Generalized anxiety  "disorder Yes    Other depression     Other insomnia     Attention deficit hyperactivity disorder (ADHD), unspecified ADHD type    Psychosocial / Contextual Factors: Challenging interactions with family, family strain regarding his same-sex relationship, neuro-diversity  PROMIS (reviewed every 90 days):   PROMIS-10 Scores        11/6/2024     3:50 PM   PROMIS-10 Total Score w/o Sub Scores   PROMIS TOTAL - SUBSCORES 19        Patient-reported         Referral / Collaboration:  Referral to another professional/service is not indicated at this time..    Anticipated number of session for this episode of care: 9-12 sessions  Anticipation frequency of session: Every other week  Anticipated Duration of each session: 38-52 minutes  Treatment plan will be reviewed in 90 days or when goals have been changed.       MeasurableTreatment Goal(s) related to diagnosis / functional impairment(s)  Goal 1: Patient will depressive symptoms as evidenced by PHQ-9 score of 11 over the next 90 days    I will know I've met my goal when  \"clearer mindset and knowing I am good.      Objective #A (Patient Action)    Fernando will demonstrate improved self-cognitions and have processes to dispatch of negative self-cognitions  Patient will Increase interest, engagement, and pleasure in doing things  Decrease frequency and intensity of feeling down, depressed, hopeless  Improve quantity and quality of night time sleep / decrease daytime naps  Feel less tired and more energy during the day   Improve diet, appetite, mindful eating, and / or meal planning  Identify negative self-talk and behaviors: challenge core beliefs, myths, and actions  Improve concentration, focus, and mindfulness in daily activities   Feel less fidgety, restless or slow in daily activities / interpersonal interactions.  Status: New - Date: 11/15/2024    Intervention(s)  Therapist will teach distraction skills, and provide cognitive behavioral strategies to assist with dispatching " negative automatic thoughts  Bibliotherapy will also be used  .      Goal 2: Patient will reduce anxiety as evidenced by reduction VANNESSA-7 from intake score of 16, over the next 90 days    I will know I've met my goal when I am worry less.      Objective #A (Patient Action)    Fernando will use relaxation strategies 5 times per day to reduce the physical symptoms of anxiety  Fernando will use at least 5 coping skills for anxiety management in the next 10 weeks.    Status: New - Date: 11/15/2024   Intervention(s)  Therapist will teach cognitive behavioral therapy to address worries and find methods of regulating tension physically and mentally.      Goal 3: Patiient will improve sleep consistency and quality    I will know I've met my goal when I can sleep better.      Objective #A (Patient Action)    Fernando will sleep at least 6.5 hours per night for 90 days.  Status: New - Date: 11/15/2024  Intervention(s)  Therapist will teach utilization of CBT-I  computer application      Patient has reviewed and agreed to the above plan.      RICCARDO Mcleod  November 18, 2024        Answers submitted by the patient for this visit:  Patient Health Questionnaire (Submitted on 11/6/2024)  If you checked off any problems, how difficult have these problems made it for you to do your work, take care of things at home, or get along with other people?: Somewhat difficult  PHQ9 TOTAL SCORE: 11  Patient Health Questionnaire (G7) (Submitted on 11/6/2024)  VANNESSA 7 TOTAL SCORE: 16

## 2024-11-18 ASSESSMENT — COLUMBIA-SUICIDE SEVERITY RATING SCALE - C-SSRS
6. HAVE YOU EVER DONE ANYTHING, STARTED TO DO ANYTHING, OR PREPARED TO DO ANYTHING TO END YOUR LIFE?: NO
2. HAVE YOU ACTUALLY HAD ANY THOUGHTS OF KILLING YOURSELF?: NO
1. HAVE YOU WISHED YOU WERE DEAD OR WISHED YOU COULD GO TO SLEEP AND NOT WAKE UP?: NO
TOTAL  NUMBER OF ABORTED OR SELF INTERRUPTED ATTEMPTS LIFETIME: NO
ATTEMPT LIFETIME: NO
TOTAL  NUMBER OF INTERRUPTED ATTEMPTS LIFETIME: NO

## 2025-01-07 ENCOUNTER — PRE VISIT (OUTPATIENT)
Dept: SURGERY | Facility: CLINIC | Age: 30
End: 2025-01-07

## 2025-01-07 ENCOUNTER — ANESTHESIA EVENT (OUTPATIENT)
Dept: SURGERY | Facility: AMBULATORY SURGERY CENTER | Age: 30
End: 2025-01-07
Payer: COMMERCIAL

## 2025-01-07 ENCOUNTER — OFFICE VISIT (OUTPATIENT)
Dept: SURGERY | Facility: CLINIC | Age: 30
End: 2025-01-07
Payer: COMMERCIAL

## 2025-01-07 ENCOUNTER — LAB (OUTPATIENT)
Dept: LAB | Facility: CLINIC | Age: 30
End: 2025-01-07
Payer: COMMERCIAL

## 2025-01-07 VITALS
DIASTOLIC BLOOD PRESSURE: 84 MMHG | BODY MASS INDEX: 26.56 KG/M2 | OXYGEN SATURATION: 99 % | TEMPERATURE: 98.1 F | SYSTOLIC BLOOD PRESSURE: 136 MMHG | HEIGHT: 71 IN | HEART RATE: 86 BPM | RESPIRATION RATE: 16 BRPM | WEIGHT: 189.7 LBS

## 2025-01-07 DIAGNOSIS — J34.2 NASAL SEPTAL DEVIATION: ICD-10-CM

## 2025-01-07 DIAGNOSIS — Z01.818 PREOP EXAMINATION: ICD-10-CM

## 2025-01-07 DIAGNOSIS — J34.89 NASAL OBSTRUCTION: ICD-10-CM

## 2025-01-07 DIAGNOSIS — Z01.818 PREOP EXAMINATION: Primary | ICD-10-CM

## 2025-01-07 LAB
CREAT SERPL-MCNC: 0.83 MG/DL (ref 0.67–1.17)
EGFRCR SERPLBLD CKD-EPI 2021: >90 ML/MIN/1.73M2
HGB BLD-MCNC: 16.2 G/DL (ref 13.3–17.7)

## 2025-01-07 PROCEDURE — 99203 OFFICE O/P NEW LOW 30 MIN: CPT | Performed by: CLINICAL NURSE SPECIALIST

## 2025-01-07 PROCEDURE — 85018 HEMOGLOBIN: CPT | Performed by: PATHOLOGY

## 2025-01-07 PROCEDURE — 36415 COLL VENOUS BLD VENIPUNCTURE: CPT | Performed by: PATHOLOGY

## 2025-01-07 PROCEDURE — 82565 ASSAY OF CREATININE: CPT | Performed by: PATHOLOGY

## 2025-01-07 ASSESSMENT — ENCOUNTER SYMPTOMS
DYSRHYTHMIAS: 0
SEIZURES: 0

## 2025-01-07 ASSESSMENT — PAIN SCALES - GENERAL: PAINLEVEL_OUTOF10: NO PAIN (0)

## 2025-01-07 ASSESSMENT — LIFESTYLE VARIABLES: TOBACCO_USE: 1

## 2025-01-07 NOTE — PATIENT INSTRUCTIONS
Preparing for Your Surgery      Name:  Fernando Del Castillo   MRN:  [id number]   :  1995   Today's Date:  2025       Arriving for surgery:  Surgery date:  25  Arrival time:  05:45 am      Please come to:     [facility] [address] [address]     Parking is available in front of the Clinics and Surgery Center building from 5:30AM to 8:00PM.  -  Proceed to the 5th floor to check into the Ambulatory Surgery Center.              >> There will be patient concierges on the 1st and 5th floor, for assistance or an escort, if you would like.              >> Please call [phone] with any questions.    What can I eat or drink?  -  You may eat and drink normally up to 8 hours prior to arrival time.   -  You may have clear liquids until 2 hours prior to arrival time.    Examples of clear liquids:  Water  Clear broth  Juices (apple, white grape, white cranberry  and cider) without pulp  Noncarbonated, powder based beverages  (lemonade and Wil-Aid)  Sodas (Sprite, 7-Up, ginger ale and seltzer)  Coffee or tea (without milk or cream)  Gatorade    -  No Alcohol or cannabis products for at least 24 hours before surgery.     Which medicines can I take?    Hold Aspirin for 7 days before surgery.   Hold Multivitamins for 7 days before surgery.  Hold Supplements for 7 days before surgery.  Hold Ibuprofen (Advil, Motrin) for 1 day(s) before surgery--unless otherwise directed by surgeon.  Hold Naproxen (Aleve) for 4 days before surgery.    -  PLEASE TAKE these medications the day of surgery:  Tylenol if needed.    How do I prepare myself?  - Please take 2 showers (one the night prior to surgery and one the morning of surgery) using Scrubcare or Hibiclens soap.    Use this soap only from the neck to your toes. Avoid genital area      Leave the soap on your skin for one minute--then rinse thoroughly.      You may use your own shampoo and conditioner.  No other hair products.   - Please remove all jewelry and body piercings.  - No lotions, deodorants or fragrance.  - No makeup or fingernail polish.   - Bring your ID and insurance card.    -If you use a CPAP machine, please bring the CPAP machine, tubing, and mask to hospital.    -If you have a Deep Brain Stimulator, Spinal Cord Stimulator, or any Neuro Stimulator device---you must bring the remote control to the hospital.      ALL PATIENTS GOING HOME THE SAME DAY OF SURGERY ARE REQUIRED TO HAVE A RESPONSIBLE ADULT TO DRIVE AND BE IN ATTENDANCE WITH THEM FOR 24 HOURS FOLLOWING SURGERY.    Covid testing policy as of 12/06/2022  Your surgeon will notify and schedule you for a COVID test if one is needed before surgery--please direct any questions or COVID symptoms to your surgeon      Questions or Concerns:    - For any questions regarding the day of surgery or your hospital stay, please contact the Pre Admission Nursing Office at [phone].       - If you have health changes between today and your surgery, please call your surgeon.       - For questions after surgery, please call your surgeons office.           Current Visitor Guidelines    You may have 2 visitors in the pre op area.    Visiting hours: 8 a.m. to 8:30 p.m.    Patients confirmed or suspected to have symptoms of COVID 19 or flu:     No visitors allowed for adult patients.   Children (under age 18) can have 1 named visitor.     People who are sick or showing symptoms of COVID 19 or flu:    Are not allowed to visit patients--we can only make exceptions in special situations.       Please follow these guidelines for your visit:          Please maintain social distance          Masking is optional--however at times you may be asked to wear a mask for the safety of yourself and others     Clean your hands with alcohol hand . Do this when you arrive at and leave the building and patient room,    And again after you touch your mask or anything in  the room.     Go directly to and from the room you are visiting.     Stay in the patient s room during your visit. Limit going to other places in the hospital as much as possible     Leave bags and jackets at home or in the car.     For everyone s health, please don t come and go during your visit. That includes for smoking   during your visit.

## 2025-01-07 NOTE — H&P
Pre-Operative H & P     CC:  Preoperative exam to assess for increased cardiopulmonary risk while undergoing surgery and anesthesia.    Date of Encounter: 1/7/2025  Primary Care Physician:  Tamela Boswell     Reason for visit:   Encounter Diagnoses   Name Primary?    Preop examination Yes    Nasal obstruction     Nasal septal deviation        HPI  Fernando Del Castillo is a 29 year old male who presents for pre-operative H & P in preparation for  Procedure Information       Case: [id number] Date/Time: 01/16/25 0715    Procedure: Septoplasty, Repair of Nasal Vestibular Stenosis, Bilateral Inferior Turbinate Reduction (Nose)    Anesthesia type: General    Diagnosis:       Nasal obstruction [J34.89]      Nasal septal deviation [J34.2]      Hypertrophy of inferior nasal turbinate [J34.3]      Nasal valve collapse [J34.829]    Pre-op diagnosis:       Nasal obstruction [J34.89]      Nasal septal deviation [J34.2]      Hypertrophy of inferior nasal turbinate [J34.3]      Nasal valve collapse [J34.829]    Location: [place] [address] / [facility]    Providers: Nadja Parrish MD          History is obtained from the patient and chart review    Patient who was recently evaluated by Dr. Parrish for ongoing issues with nasal obstruction and congestion.  He had been told that he had a deviated nasal septum.  He reported ongoing issues with breathing through his nose, and constant congestion.  He has occasional maxillary sinus pain.  He has attempted conservative management with allergy medications but had no relief.  He also attempted steroid nasal sprays for 3 months, but again had no benefit.  Surgical options were discussed and he was counseled for above procedures     The patient has a history of smoking cigarettes from 6013-2994 and would smoke a pack every two weeks. He also had a vaping history, but quit in 2022.     His history is otherwise significant for ADD, anxiety,  depression       Hx of abnormal bleeding or anti-platelet use: Denies      Past Medical History  Past Medical History:   Diagnosis Date    ADD (attention deficit disorder)     VANNESSA (generalized anxiety disorder)     Hypertrophy of inferior nasal turbinate     Nasal obstruction     Nasal septal deviation     Nasal valve collapse     Other depression 2024       Past Surgical History  Past Surgical History:   Procedure Laterality Date    MOUTH SURGERY      gum graft    [place] teeth extraction          Prior to Admission Medications  Current Outpatient Medications   Medication Sig Dispense Refill    finasteride (PROPECIA) 1 MG tablet Take 1 tablet (1 mg) by mouth daily (Patient taking differently: Take 1 tablet by mouth at bedtime.) 90 tablet 3    traZODone (DESYREL) 50 MG tablet Take 1 tablet (50 mg) by mouth at bedtime. 90 tablet 1       Allergies  No Known Allergies    Social History  Social History     Socioeconomic History    Marital status: Single     Spouse name: Not on file    Number of children: Not on file    Years of education: Not on file    Highest education level: Not on file   Occupational History    Not on file   Tobacco Use    Smoking status: Former     Current packs/day: 0.00     Average packs/day: 0.5 packs/day for 7.0 years (3.5 ttl pk-yrs)     Types: Cigarettes     Start date: 2013     Quit date: 2020     Years since quittin.3    Smokeless tobacco: Former     Quit date: 2022   Vaping Use    Vaping status: Never Used   Substance and Sexual Activity    Alcohol use: Not Currently    Drug use: Never    Sexual activity: Yes     Partners: Male     Birth control/protection: None   Other Topics Concern    Parent/sibling w/ CABG, MI or angioplasty before 65F 55M? No   Social History Narrative    Not on file     Social Drivers of Health     Financial Resource Strain: Low Risk  (2024)    Financial Resource Strain     Within the past 12 months, have you or your family members you live  with been unable to get utilities (heat, electricity) when it was really needed?: No   Food Insecurity: Unknown (4/4/2024)    Food Insecurity     Within the past 12 months, did you worry that your food would run out before you got money to buy more?: Patient declined     Within the past 12 months, did the food you bought just not last and you didn t have money to get more?: Patient declined   Transportation Needs: Low Risk  (4/4/2024)    Transportation Needs     Within the past 12 months, has lack of transportation kept you from medical appointments, getting your medicines, non-medical meetings or appointments, work, or from getting things that you need?: No   Physical Activity: Inactive (3/8/2023)    [facility]    Exercise Vital Sign     Days of Exercise per Week: 0 days     Minutes of Exercise per Session: 0 min   Stress: Stress Concern Present (3/8/2023)    [facility]    [employer] - Occupational Stress Questionnaire     Feeling of Stress : To some extent   Social Connections: Moderately Isolated (3/8/2023)    [facility]    Social Connection and Isolation Panel [NHANES]     Frequency of Communication with Friends and Family: Twice a week     Frequency of Social Gatherings with Friends and Family: Once a week     Attends Synagogue Services: Never     Active Member of Clubs or Organizations: No     Attends Club or Organization Meetings: Never     Marital Status: Living with partner   Interpersonal Safety: Low Risk  (4/5/2024)    Interpersonal Safety     Do you feel physically and emotionally safe where you currently live?: Yes     Within the past 12 months, have you been hit, slapped, kicked or otherwise physically hurt by someone?: No     Within the past 12 months, have you been  humiliated or emotionally abused in other ways by your partner or ex-partner?: No   Housing Stability: Low Risk  (4/4/2024)    Housing Stability     Do you have housing? : Yes     Are you worried about losing your housing?: No       Family History  Family History   Problem Relation Age of Onset    Depression Mother     Anxiety Disorder Mother     Mental Illness Mother     Hyperlipidemia Father     Lupus Sister     Other Cancer Brother     Diabetes Maternal Grandmother     Obesity Maternal Grandmother     Anesthesia Reaction No family hx of     Clotting Disorder No family hx of     Bleeding Disorder No family hx of        Review of Systems  The complete review of systems is negative other than noted in the HPI or here.   Anesthesia Evaluation   Pt has had prior anesthetic. Type: MAC.    No history of anesthetic complications       ROS/MED HX  ENT/Pulmonary: Comment: Nasal obstruction  Nasal septal deviation    (+)                tobacco use, Past use,                    (-) recent URI   Neurologic: Comment: ADHD-no meds   (-) no seizures   Cardiovascular:     (+)  - -   -  - -                                 Previous cardiac testing   Echo: Date: Results:    Stress Test:  Date: Results:    ECG Reviewed:  Date: 7/30/2024 Results:  Sinus Rhythm   -RSR(V1) -nondiagnostic.  Cath:  Date: Results:   (-) taking anticoagulants/antiplatelets, BAJWA and arrhythmias   METS/Exercise Tolerance: >4 METS    Hematologic:    (-) history of blood clots and history of blood transfusion   Musculoskeletal:  - neg musculoskeletal ROS     GI/Hepatic:    (-) GERD   Renal/Genitourinary:  - neg Renal ROS     Endo:  - neg endo ROS     Psychiatric/Substance Use:     (+) psychiatric history anxiety and depression       Infectious Disease:  - neg infectious disease ROS     Malignancy:  - neg malignancy ROS     Other:  - neg other ROS          /84 (BP Location: Right arm, Patient Position: Sitting, Cuff Size: Adult Regular)   Pulse 86   Temp  "98.1  F (36.7  C) (Oral)   Resp 16   Ht 1.803 m (5' 11\")   Wt 86 kg (189 lb 11.2 oz)   SpO2 99%   BMI 26.46 kg/m      Physical Exam   Constitutional: Awake, alert, cooperative, no apparent distress, and appears stated age.  Eyes: Pupils equal, round and reactive to light, extra ocular muscles intact, sclera clear, conjunctiva normal.  HENT: Normocephalic, oral pharynx with moist mucus membranes, good dentition. No goiter appreciated.   Respiratory: Clear to auscultation bilaterally, no crackles or wheezing.  No cough or obvious dyspnea  Cardiovascular: Regular rate and rhythm, normal S1 and S2, and no murmur noted.  Carotids +2, no bruits. No edema. Palpable pulses to radial  DP and PT arteries.   GI: Normal bowel sounds, soft, non-distended, non-tender, no masses palpated, no hepatosplenomegaly.    Lymph/Hematologic: No cervical lymphadenopathy and no supraclavicular lymphadenopathy.  Genitourinary: Deferred  Skin: Warm and dry.    Musculoskeletal: Full ROM of neck. There is no redness, warmth, or swelling of the joints. Gross motor strength is normal.    Neurologic: Awake, alert, oriented to name, place and time. Cranial nerves II-XII are grossly intact. Gait is normal.   Neuropsychiatric: Calm, cooperative. Normal affect.     Prior Labs/Diagnostic Studies   All labs and imaging personally reviewed     EK24 Sinus Rhythm   -RSR(V1) -nondiagnostic.      The patient's records and results personally reviewed by this provider.     Outside records reviewed from: Care Everywhere    LAB/DIAGNOSTIC STUDIES TODAY:    Hgb 16.2  Cr 0.83    Assessment    Fernando Del Castillo is a 29 year old male seen as a PAC referral for risk assessment and optimization for anesthesia.    Plan/Recommendations  Pt will be optimized for the proposed procedure.  See below for details on the assessment, risk, and preoperative recommendations    NEUROLOGY  - No history of TIA, CVA or seizure  History ADHD, but no current meds  -Post Op " "delirium risk factors:  No risk identified    ENT  - No current airway concerns.  Will need to be reassessed day of surgery.  Mallampati: I  TM: > 3    CARDIAC  Denies cardiac history, symptoms, or meds Good exercise tolerance without exertional symptoms  - METS (Metabolic Equivalents)>4    RCRI: 0.4% risk of serious cardiac events      PULMONARY  Denies asthma, cough or use of inhaler  Low risk for JAMES  - Tobacco History    History   Smoking Status    Former    Types: Cigarettes   Smokeless Tobacco    Former    Quit date: 12/29/2022       GI: Denies GERD  PONV Low Risk  Total Score: 1           1 AN PONV: Patient is not a current smoker        /RENAL  - Baseline Creatinine  0.83    ENDOCRINE    - BMI: Estimated body mass index is 26.46 kg/m  as calculated from the following:    Height as of this encounter: 1.803 m (5' 11\").    Weight as of this encounter: 86 kg (189 lb 11.2 oz).  Overweight (BMI 25.0-29.9)  - No history of Diabetes Mellitus  TSH 1.20    Propecia at bedtime for hair loss    HEME  VTE Low Risk 0.26%             Total Score: 0      Denies personal or family history of blood clots  Denies personal or family history of bleeding disorder  Denies history of blood transfusion     MSK  Patient is NOT Frail             Total Score: 0        PSYCH  -History of anxiety/depression.  No meds at this time    Different anesthesia methods/types have been discussed with the patient, but they are aware that the final plan will be decided by the assigned anesthesia provider on the date of service.    The patient is optimized for their procedure. AVS with information on surgery time/arrival time, meds and NPO status given by nursing staff. No further diagnostic testing indicated.      On the day of service:     Prep time: 12 minutes  Visit time: 10 minutes  Documentation time: 13 minutes  ------------------------------------------  Total time: 35 minutes      MARIA VICTORIA Duarte CNS  Preoperative Assessment " [facility]  Phone: [phone]  Fax: [phone]

## 2025-01-16 ENCOUNTER — ANESTHESIA (OUTPATIENT)
Dept: SURGERY | Facility: AMBULATORY SURGERY CENTER | Age: 30
End: 2025-01-16
Payer: COMMERCIAL

## 2025-01-16 RX ORDER — PROPOFOL 10 MG/ML
INJECTION, EMULSION INTRAVENOUS CONTINUOUS PRN
Status: DISCONTINUED | OUTPATIENT
Start: 2025-01-16 | End: 2025-01-16

## 2025-01-16 RX ORDER — DEXAMETHASONE SODIUM PHOSPHATE 4 MG/ML
INJECTION, SOLUTION INTRA-ARTICULAR; INTRALESIONAL; INTRAMUSCULAR; INTRAVENOUS; SOFT TISSUE PRN
Status: DISCONTINUED | OUTPATIENT
Start: 2025-01-16 | End: 2025-01-16

## 2025-01-16 RX ORDER — PROPOFOL 10 MG/ML
INJECTION, EMULSION INTRAVENOUS PRN
Status: DISCONTINUED | OUTPATIENT
Start: 2025-01-16 | End: 2025-01-16

## 2025-01-16 RX ORDER — LIDOCAINE HYDROCHLORIDE 20 MG/ML
INJECTION, SOLUTION INFILTRATION; PERINEURAL PRN
Status: DISCONTINUED | OUTPATIENT
Start: 2025-01-16 | End: 2025-01-16

## 2025-01-16 RX ORDER — FENTANYL CITRATE 50 UG/ML
INJECTION, SOLUTION INTRAMUSCULAR; INTRAVENOUS PRN
Status: DISCONTINUED | OUTPATIENT
Start: 2025-01-16 | End: 2025-01-16

## 2025-01-16 RX ORDER — ONDANSETRON 2 MG/ML
INJECTION INTRAMUSCULAR; INTRAVENOUS PRN
Status: DISCONTINUED | OUTPATIENT
Start: 2025-01-16 | End: 2025-01-16

## 2025-01-16 RX ADMIN — FENTANYL CITRATE 50 MCG: 50 INJECTION, SOLUTION INTRAMUSCULAR; INTRAVENOUS at 07:22

## 2025-01-16 RX ADMIN — PROPOFOL 150 MCG/KG/MIN: 10 INJECTION, EMULSION INTRAVENOUS at 09:01

## 2025-01-16 RX ADMIN — Medication 0.5 MG: at 09:25

## 2025-01-16 RX ADMIN — Medication 50 MG: at 07:22

## 2025-01-16 RX ADMIN — PROPOFOL 250 MG: 10 INJECTION, EMULSION INTRAVENOUS at 07:22

## 2025-01-16 RX ADMIN — DEXAMETHASONE SODIUM PHOSPHATE 10 MG: 4 INJECTION, SOLUTION INTRA-ARTICULAR; INTRALESIONAL; INTRAMUSCULAR; INTRAVENOUS; SOFT TISSUE at 07:30

## 2025-01-16 RX ADMIN — ONDANSETRON 4 MG: 2 INJECTION INTRAMUSCULAR; INTRAVENOUS at 10:14

## 2025-01-16 RX ADMIN — PROPOFOL 150 MCG/KG/MIN: 10 INJECTION, EMULSION INTRAVENOUS at 08:21

## 2025-01-16 RX ADMIN — PROPOFOL 200 MCG/KG/MIN: 10 INJECTION, EMULSION INTRAVENOUS at 07:22

## 2025-01-16 RX ADMIN — LIDOCAINE HYDROCHLORIDE 100 MG: 20 INJECTION, SOLUTION INFILTRATION; PERINEURAL at 07:22

## 2025-01-16 RX ADMIN — FENTANYL CITRATE 50 MCG: 50 INJECTION, SOLUTION INTRAMUSCULAR; INTRAVENOUS at 07:30

## 2025-01-16 RX ADMIN — CEFAZOLIN SODIUM 2 G: 2 SOLUTION INTRAVENOUS at 07:13

## 2025-01-16 RX ADMIN — PROPOFOL 150 MCG/KG/MIN: 10 INJECTION, EMULSION INTRAVENOUS at 09:41

## 2025-01-16 RX ADMIN — PROPOFOL 150 MCG/KG/MIN: 10 INJECTION, EMULSION INTRAVENOUS at 07:46

## 2025-01-16 NOTE — ANESTHESIA CARE TRANSFER NOTE
Patient: Fernando Del Castillo    Procedure: Procedure(s):  Septoplasty, Repair of Nasal Vestibular Stenosis, Bilateral Inferior Turbinate Reduction       Diagnosis: Nasal obstruction [J34.89]  Nasal septal deviation [J34.2]  Hypertrophy of inferior nasal turbinate [J34.3]  Nasal valve collapse [J34.829]  Diagnosis Additional Information: No value filed.    Anesthesia Type:   General     Note:    Oropharynx: oropharynx clear of all foreign objects and spontaneously breathing  Level of Consciousness: awake and drowsy  Oxygen Supplementation: face mask  Level of Supplemental Oxygen (L/min / FiO2): 6  Independent Airway: airway patency satisfactory and stable  Dentition: dentition unchanged  Vital Signs Stable: post-procedure vital signs reviewed and stable  Report to RN Given: handoff report given  Patient transferred to: PACU    Handoff Report: Identifed the Patient, Identified the Reponsible Provider, Reviewed the pertinent medical history, Discussed the surgical course, Reviewed Intra-OP anesthesia mangement and issues during anesthesia, Set expectations for post-procedure period and Allowed opportunity for questions and acknowledgement of understanding    See post op vitals  Vitals:  Vitals Value Taken Time   /96 01/16/25 1107   Temp     Pulse 96 01/16/25 1112   Resp 9 01/16/25 1112   SpO2 96 % 01/16/25 1112   Vitals shown include unfiled device data.    Electronically Signed By: MARIA VICTORIA Lema CRNA  January 16, 2025  11:13 AM

## 2025-01-16 NOTE — ANESTHESIA PREPROCEDURE EVALUATION
Anesthesia Pre-Procedure Evaluation    Patient: Fernando Del Castillo   MRN: [id number] : 1995        Procedure : Procedure(s):  Septoplasty, Repair of Nasal Vestibular Stenosis, Bilateral Inferior Turbinate Reduction          Past Medical History:   Diagnosis Date    ADD (attention deficit disorder)     VANNESSA (generalized anxiety disorder)     Hypertrophy of inferior nasal turbinate     Nasal obstruction     Nasal septal deviation     Nasal valve collapse     Other depression 2024      Past Surgical History:   Procedure Laterality Date    MOUTH SURGERY      gum graft    [place] teeth extraction         No Known Allergies   Social History     Tobacco Use    Smoking status: Former     Current packs/day: 0.00     Average packs/day: 0.5 packs/day for 7.0 years (3.5 ttl pk-yrs)     Types: Cigarettes     Start date: 2013     Quit date: 2020     Years since quittin.3    Smokeless tobacco: Former     Quit date: 2022   Substance Use Topics    Alcohol use: Not Currently      Wt Readings from Last 1 Encounters:   25 85.7 kg (189 lb)        Anesthesia Evaluation            ROS/MED HX  ENT/Pulmonary:  - neg pulmonary ROS     Neurologic:  - neg neurologic ROS     Cardiovascular:  - neg cardiovascular ROS     METS/Exercise Tolerance:     Hematologic:  - neg hematologic  ROS     Musculoskeletal:  - neg musculoskeletal ROS     GI/Hepatic:  - neg GI/hepatic ROS     Renal/Genitourinary:  - neg Renal ROS     Endo:  - neg endo ROS     Psychiatric/Substance Use:  - neg psychiatric ROS   (+) psychiatric history anxiety and depression       Infectious Disease:  - neg infectious disease ROS     Malignancy:  - neg malignancy ROS     Other:  - neg other ROS          Physical Exam    Airway  airway exam normal      Mallampati: II   TM distance: > 3 FB   Neck ROM: full   Mouth opening: > 3 cm    Respiratory Devices and Support         Dental       (+) Completely normal teeth      Cardiovascular          Rhythm and  "rate: regular and normal     Pulmonary   pulmonary exam normal        breath sounds clear to auscultation           OUTSIDE LABS:  CBC:   Lab Results   Component Value Date    WBC 3.7 (L) 05/08/2024    WBC 3.8 (L) 04/05/2024    HGB 16.2 01/07/2025    HGB 16.0 05/08/2024    HCT 46.3 05/08/2024    HCT 47.4 04/05/2024     05/08/2024     04/05/2024     BMP:   Lab Results   Component Value Date     04/05/2024    POTASSIUM 4.4 04/05/2024    CHLORIDE 103 04/05/2024    CO2 26 04/05/2024    BUN 14.0 04/05/2024    CR 0.83 01/07/2025    CR 0.78 04/05/2024    GLC 85 04/05/2024     COAGS: No results found for: \"PTT\", \"INR\", \"FIBR\"  POC: No results found for: \"BGM\", \"HCG\", \"HCGS\"  HEPATIC:   Lab Results   Component Value Date    ALBUMIN 5.1 04/05/2024    PROTTOTAL 7.6 04/05/2024    ALT 23 04/05/2024    AST 22 04/05/2024    ALKPHOS 65 04/05/2024    BILITOTAL 0.4 04/05/2024     OTHER:   Lab Results   Component Value Date    ADELAIDA 9.9 04/05/2024    TSH 1.20 04/05/2024       Anesthesia Plan    ASA Status:  1    NPO Status:  NPO Appropriate    Anesthesia Type: General.     - Airway: ETT      Maintenance: Balanced.        Consents    Anesthesia Plan(s) and associated risks, benefits, and realistic alternatives discussed. Questions answered and patient/representative(s) expressed understanding.     - Discussed:     - Discussed with:  Patient      - Extended Intubation/Ventilatory Support Discussed: No.      - Patient is DNR/DNI Status: No     Use of blood products discussed: No .     Postoperative Care    Pain management: Oral pain medications, IV analgesics, Multi-modal analgesia.   PONV prophylaxis: Ondansetron (or other 5HT-3), Background Propofol Infusion     Comments:               Christian Monge MD    I have reviewed the pertinent notes and labs in the chart from the past 30 days and (re)examined the patient.  Any updates or changes from those notes are reflected in this note.                         # " "Overweight: Estimated body mass index is 26.36 kg/m  as calculated from the following:    Height as of this encounter: 1.803 m (5' 11\").    Weight as of this encounter: 85.7 kg (189 lb).             "

## 2025-01-16 NOTE — ANESTHESIA PROCEDURE NOTES
Airway       Patient location during procedure: OR       Procedure Start/Stop Times: 1/16/2025 7:27 AM  Staff -        Performed By: CRNA  Consent for Airway        Urgency: elective  Indications and Patient Condition       Indications for airway management: keysha-procedural       Induction type:intravenous       Mask difficulty assessment: 1 - vent by mask    Final Airway Details       Final airway type: endotracheal airway       Successful airway: ETT - single, Oral and HOLLY  Endotracheal Airway Details        ETT size (mm): 8.0       Cuffed: yes       Cuff volume (mL): 23       Successful intubation technique: direct laryngoscopy       DL Blade Type: Khan 2       Grade View of Cords: 2       Adjucts: stylet       Position: Center       Measured from: lips       Bite block used: None    Post intubation assessment        Placement verified by: capnometry, equal breath sounds and chest rise        Number of attempts at approach: 1       Secured with: tape       Ease of procedure: easy       Dentition: Intact and Unchanged    Medication(s) Administered   Medication Administration Time: 1/16/2025 7:27 AM

## 2025-01-16 NOTE — ANESTHESIA POSTPROCEDURE EVALUATION
Patient: Fernando Del Castillo    Procedure: Procedure(s):  Septoplasty, Repair of Nasal Vestibular Stenosis, Bilateral Inferior Turbinate Reduction       Anesthesia Type:  General    Note:  Disposition: Outpatient   Postop Pain Control: Uneventful            Sign Out: Well controlled pain   PONV: No   Neuro/Psych: Uneventful            Sign Out: Acceptable/Baseline neuro status   Airway/Respiratory: Uneventful            Sign Out: Acceptable/Baseline resp. status   CV/Hemodynamics: Uneventful            Sign Out: Acceptable CV status   Other NRE: NONE   DID A NON-ROUTINE EVENT OCCUR? No           Last vitals:  Vitals Value Taken Time   /93 01/16/25 1145   Temp 36.6  C (97.8  F) 01/16/25 1145   Pulse 87 01/16/25 1145   Resp 12 01/16/25 1145   SpO2 94 % 01/16/25 1145       Electronically Signed By: Christian Monge MD  January 16, 2025  2:05 PM

## 2025-01-22 ENCOUNTER — ALLIED HEALTH/NURSE VISIT (OUTPATIENT)
Dept: OTOLARYNGOLOGY | Facility: CLINIC | Age: 30
End: 2025-01-22
Payer: COMMERCIAL

## 2025-01-22 DIAGNOSIS — J34.89 NASAL OBSTRUCTION: Primary | ICD-10-CM

## 2025-01-22 NOTE — PROGRESS NOTES
Pt is POD #6 s/p  Complex open septoplasty, Bilateral inferior turbinate reduction and outfracture, Nasal valve repair, bilateral.     Nasal cast and bilateral Arrington splints removed. Pt's nose is appropriately swollen and tender.    Mild bilateral periorbital bruising present.    Bilateral nasal passageways suctioned of excess mucous and crusting. Pt pleased with improvement in nasal breathing post splint removal.    We discussed the importance of maintaining post-op restrictions for one more week and to continue with frequent use of nasal saline spray.    Follow-up in 4-6 weeks.    Noemi Rai RN  1/22/2025 4:29 PM

## 2025-02-26 ENCOUNTER — ALLIED HEALTH/NURSE VISIT (OUTPATIENT)
Dept: OTOLARYNGOLOGY | Facility: CLINIC | Age: 30
End: 2025-02-26
Payer: COMMERCIAL

## 2025-02-26 DIAGNOSIS — Z98.890 POSTOPERATIVE STATE: Primary | ICD-10-CM

## 2025-02-27 NOTE — PROGRESS NOTES
Pt is PO 1/16/25 Complex open septoplasty, Bilateral inferior turbinate reduction and outfracture, Nasal valve repair, bilateral.     Nasal swelling and tenderness improved since last visit.  Pt states he intermittently still feels obstructed with nasal breathing.    Septum is midline.  Nasal mucosa on the left is pink and moist, nasal mucosa on the right is pink and dry.      Pt encouraged to continue with frequent use of nasal saline spray and to apply a small amount of Aquaphor in the right nasal passageway d/t dryness and scabbing.    Follow-up in 3 months.    Noemi Rai RN  2/27/2025 2:46 PM

## 2025-03-06 ENCOUNTER — PATIENT OUTREACH (OUTPATIENT)
Dept: CARE COORDINATION | Facility: CLINIC | Age: 30
End: 2025-03-06
Payer: COMMERCIAL

## 2025-03-20 ENCOUNTER — PATIENT OUTREACH (OUTPATIENT)
Dept: CARE COORDINATION | Facility: CLINIC | Age: 30
End: 2025-03-20
Payer: COMMERCIAL

## 2025-04-21 ENCOUNTER — MYC MEDICAL ADVICE (OUTPATIENT)
Dept: OTOLARYNGOLOGY | Facility: CLINIC | Age: 30
End: 2025-04-21
Payer: COMMERCIAL

## 2025-05-14 ENCOUNTER — OFFICE VISIT (OUTPATIENT)
Dept: OTOLARYNGOLOGY | Facility: CLINIC | Age: 30
End: 2025-05-14
Payer: COMMERCIAL

## 2025-05-14 VITALS — WEIGHT: 191 LBS | HEIGHT: 71 IN | BODY MASS INDEX: 26.74 KG/M2

## 2025-05-14 DIAGNOSIS — Z98.890 POSTOPERATIVE STATE: Primary | ICD-10-CM

## 2025-05-14 PROCEDURE — 1126F AMNT PAIN NOTED NONE PRSNT: CPT | Performed by: OTOLARYNGOLOGY

## 2025-05-14 PROCEDURE — 99212 OFFICE O/P EST SF 10 MIN: CPT | Performed by: OTOLARYNGOLOGY

## 2025-05-14 ASSESSMENT — PATIENT HEALTH QUESTIONNAIRE - PHQ9: SUM OF ALL RESPONSES TO PHQ QUESTIONS 1-9: 3

## 2025-05-14 ASSESSMENT — PAIN SCALES - GENERAL: PAINLEVEL_OUTOF10: NO PAIN (0)

## 2025-05-14 NOTE — LETTER
5/14/2025       RE: Fernando Del Castillo  [address] [place] [address]     Dear Colleague,    Thank you for referring your patient, Fernando Del Castillo, to the [facility] [place] at [facility]. Please see a copy of my visit note below.    Facial Plastic and Reconstructive Surgery      Fernando Del Castillo is here 4 months post nasal surgery for nasal obstruction.  He is doing well.  He feels that breathing is good.  He is happy with his current functioning.  He continues to have some residual swelling of the nasal tip in the columella.  It should continue to improve.  Anterior rhinoscopy demonstrates a septum that is nicely straight and turbinates are reduced he has a nicely patent airway bilaterally.  I do not see much congestion today other than a little bit of an abrasion on the floor on the left side which correlates with some bloody secretions he has been having on the left.    He will continue nasal saline I will see him back in 3 months time    Again, thank you for allowing me to participate in the care of your patient.      Sincerely,    Nadja Parrish MD

## 2025-05-18 ENCOUNTER — HEALTH MAINTENANCE LETTER (OUTPATIENT)
Age: 30
End: 2025-05-18

## 2025-06-19 ENCOUNTER — MYC REFILL (OUTPATIENT)
Dept: FAMILY MEDICINE | Facility: CLINIC | Age: 30
End: 2025-06-19
Payer: COMMERCIAL

## 2025-06-19 DIAGNOSIS — F32.A ANXIETY AND DEPRESSION: ICD-10-CM

## 2025-06-19 DIAGNOSIS — F41.9 ANXIETY AND DEPRESSION: ICD-10-CM

## 2025-06-19 DIAGNOSIS — G47.09 OTHER INSOMNIA: ICD-10-CM

## 2025-06-19 RX ORDER — TRAZODONE HYDROCHLORIDE 50 MG/1
50 TABLET ORAL AT BEDTIME
Qty: 90 TABLET | Refills: 0 | Status: SHIPPED | OUTPATIENT
Start: 2025-06-19

## 2025-06-19 RX ORDER — TRAZODONE HYDROCHLORIDE 50 MG/1
50 TABLET ORAL AT BEDTIME
Qty: 90 TABLET | Refills: 0 | OUTPATIENT
Start: 2025-06-19

## 2025-08-06 ENCOUNTER — OFFICE VISIT (OUTPATIENT)
Dept: OTOLARYNGOLOGY | Facility: CLINIC | Age: 30
End: 2025-08-06
Payer: COMMERCIAL

## 2025-08-06 VITALS — HEIGHT: 71 IN | BODY MASS INDEX: 26.74 KG/M2 | WEIGHT: 191 LBS

## 2025-08-06 DIAGNOSIS — Z98.890 POSTOPERATIVE STATE: Primary | ICD-10-CM

## 2025-08-06 PROCEDURE — 99212 OFFICE O/P EST SF 10 MIN: CPT | Performed by: OTOLARYNGOLOGY
